# Patient Record
Sex: MALE | Race: WHITE | NOT HISPANIC OR LATINO | Employment: FULL TIME | URBAN - NONMETROPOLITAN AREA
[De-identification: names, ages, dates, MRNs, and addresses within clinical notes are randomized per-mention and may not be internally consistent; named-entity substitution may affect disease eponyms.]

---

## 2018-05-29 ENCOUNTER — DOCUMENTATION ONLY (OUTPATIENT)
Dept: FAMILY MEDICINE | Facility: OTHER | Age: 33
End: 2018-05-29

## 2018-05-29 ENCOUNTER — ALLIED HEALTH/NURSE VISIT (OUTPATIENT)
Dept: FAMILY MEDICINE | Facility: OTHER | Age: 33
End: 2018-05-29
Attending: FAMILY MEDICINE
Payer: COMMERCIAL

## 2018-05-29 DIAGNOSIS — F43.21 GRIEF: Primary | ICD-10-CM

## 2018-05-29 PROCEDURE — 99207 ZZC NO CHARGE NURSE ONLY: CPT

## 2018-05-29 NOTE — PROGRESS NOTES
Spoke with patient who was looking for SSN for his father who passed. During discussion, became clear that patient has not yet received a death certificate which is the issue yet. Encouraged patient to contact [facility] providers who treated his father for status on the certificate .

## 2018-05-29 NOTE — PROGRESS NOTES
Patient met with  regarding his father's death and needing his SSN to have him cremated. There was no release in our recordfs so were unable to assist him but gave him the phone numbers needed for someone who could better assist him.

## 2018-05-29 NOTE — MR AVS SNAPSHOT
After Visit Summary   5/29/2018    Mario Vance    MRN: [id number]           Patient Information     Date Of Birth          1985        Visit Information        Provider Department      5/29/2018 1:00 PM HC FP NURSE [facility]        Today's Diagnoses     Grief    -  1       Follow-ups after your visit        Who to contact     If you have questions or need follow up information about today's clinic visit or your schedule please contact [facility] directly at [phone].  Normal or non-critical lab and imaging results will be communicated to you by MyChart, letter or phone within 4 business days after the clinic has received the results. If you do not hear from us within 7 days, please contact the clinic through MyChart or phone. If you have a critical or abnormal lab result, we will notify you by phone as soon as possible.  Submit refill requests through Normal or call your pharmacy and they will forward the refill request to us. Please allow 3 business days for your refill to be completed.          Additional Information About Your Visit        Care EveryWhere ID     This is your Care EveryWhere ID. This could be used by other organizations to access your [facility] medical records  [id number]         Blood Pressure from Last 3 Encounters:   04/21/15 100/68   04/10/15 128/80   04/06/15 121/96    Weight from Last 3 Encounters:   04/21/15 210 lb (95.3 kg)   04/10/15 210 lb (95.3 kg)   04/06/15 210 lb (95.3 kg)              Today, you had the following     No orders found for display       Primary Care Provider Fax #    Physician No Ref-Primary [phone]       No address on file        Equal Access to Services     MAI VIVEROS : Gerson Alcaraz, waaxda luqadaha, qaybta kaalmada tyson de la o. So [facility] [phone].    ATENCIÓN: Si habla español, tiene a marcus disposición servicios gratuitos de asistencia  lingüísticaLorie Sands al [phone].    We comply with applicable federal civil rights laws and [place] laws. We do not discriminate on the basis of race, color, national origin, age, disability, sex, sexual orientation, or gender identity.            Thank you!     Thank you for choosing [facility]  for your care. Our goal is always to provide you with excellent care. Hearing back from our patients is one way we can continue to improve our services. Please take a few minutes to complete the written survey that you may receive in the mail after your visit with us. Thank you!             Your Updated Medication List - Protect others around you: Learn how to safely use, store and throw away your medicines at www.disposemymeds.org.      Notice  As of 5/29/2018  1:08 PM    You have not been prescribed any medications.

## 2019-01-17 ENCOUNTER — TRANSFERRED RECORDS (OUTPATIENT)
Dept: HEALTH INFORMATION MANAGEMENT | Facility: CLINIC | Age: 34
End: 2019-01-17

## 2019-01-17 LAB
ALT SERPL-CCNC: 258 U/L (ref 0–45)
AST SERPL-CCNC: 174 U/L (ref 0–41)
CHOLEST SERPL-MCNC: 262 MG/DL (ref 160–200)
CREAT SERPL-MCNC: 0.78 MG/DL (ref 0.6–1.4)
GLUCOSE SERPL-MCNC: 95 MG/DL (ref 60–109)
HDLC SERPL-MCNC: 66.5 MG/DL (ref 25–75)
HEP C HIM: NORMAL
LDLC SERPL CALC-MCNC: 166.7 MG/DL (ref 60–185)
TRIGL SERPL-MCNC: 144 MG/DL (ref 10–200)

## 2019-01-22 LAB
ALBUMIN 24H UR-MCNC: 33 MG/D
ALBUMIN SERPL-MCNC: 4.8 G/DL
ALP SERPL-CCNC: 130 U/L
ALT SERPL-CCNC: 258 U/L
AST SERPL-CCNC: 174 U/L
BILIRUB SERPL-MCNC: 0.23 MG/DL
BLOOD URINE (EXTERNAL): 0
BUN SERPL-MCNC: 11 MG/DL
BUN SERPL-MCNC: 11 MG/DL
CHOLEST SERPL-MCNC: 262 MG/DL
CHOLEST/HDLC SERPL: 3.93 {RATIO}
CREAT SERPL-MCNC: 0.78 MG/DL
CREATINE URINE: 284
FRUCTOSAMINE SERPL-SCNC: 1.43 MMOL/L (ref 1.2–2)
FRUCTOSAMINE SERPL-SCNC: 1.43 MMOL/L (ref 190–270)
GLOBULIN: 2.6 G/DL
GLUCOSE SERPL-MCNC: 95 MG/DL (ref 70–99)
GLUCOSE URINE: ABNORMAL MG/DL
HDLC SERPL-MCNC: 66.5 MG/DL
HEAPTITIS C ABY: NORMAL
HIV1 AB SPEC QL IA.RAPID: NON REACTIVE
HYALINE CAST URINE (EXTERNAL): 0
LDL CHOLESTEROL: 166.7
LEUKOCYTE ESTERASE UR: POSITIVE U/L
Lab: 0
Lab: 311
Lab: NEGATIVE
NICOTINE: POSITIVE
PROT SERPL-MCNC: 7.4 G/DL
PROTEIN/CREAT RATIO, RANDOM UR: 116 MG/GCREA
RBC URINE: 0
TRIGL SERPL-MCNC: 144 MG/DL
WBC URINE: 0

## 2019-02-12 NOTE — PROGRESS NOTES
"  SUBJECTIVE:   Mario aVnce is a 33 year old male who presents to clinic today for the following health issues:      New Patient/Transfer of Care    Patient is a 33 year old male who presents to clinic to establish care. He reports no medical problems now or in th past. 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 He was also denied life insurance due to elevated LFTs on his life insurance physical. He states this was done just after an all inclusive trip to [place] and he feels the labs have likely improved.     He has no other concerns today. He does note that his BP was elevated today. He is slightly anxious and reports drinking a large energy drink prior to appointment. He has not been told he has elevated BP in the past.     Problem list and histories reviewed & adjusted, as indicated.  Additional history: as documented    Labs reviewed in EPIC    Reviewed and updated as needed this visit by clinical staff  Tobacco  Allergies  Meds  Problems  Med Hx  Surg Hx  Fam Hx  Soc Hx        Reviewed and updated as needed this visit by Provider  Tobacco  Allergies  Meds  Problems  Med Hx  Surg Hx  Fam Hx  Soc Hx          ROS:  Constitutional, HEENT, cardiovascular, pulmonary, gi and gu systems are negative, except as otherwise noted.    OBJECTIVE:     BP (!) 150/100 (BP Location: Left arm, Patient Position: Chair, Cuff Size: Adult Large)   Pulse 66   Temp 98  F (36.7  C) (Tympanic)   Resp 20   Wt 120.2 kg (265 lb)   .6 cm (66\")   SpO2 96%   BMI 42.77 kg/m    Body mass index is 42.77 kg/m .  GENERAL: healthy, alert and no distress  NECK: no adenopathy, no asymmetry, masses, or scars and thyroid normal to palpation  RESP: lungs clear to auscultation - no rales, rhonchi or wheezes  CV: regular rate and rhythm, normal S1 S2, no S3 or S4, no murmur, click or rub, no peripheral edema and peripheral pulses strong  MS: no gross " musculoskeletal defects noted, no edema  NEURO: Normal strength and tone, mentation intact and speech normal  PSYCH: mentation appears normal, affect normal/bright    Diagnostic Test Results:  19 - OUTSIDE LABS  ALK Phos: 130  AST: 174  ALT: 258  GGT: 311  Cholesterol: 262  LDL: 166  T  HDL: 66    Hepatitis C: Non reactive  HIV: Non reactive    UA: total protein 33 (high) o/w wnl        ASSESSMENT/PLAN:     Morbid obesity (H)  BMI is 42 with elevated cholesterol levels. Recommend weight loss. Possibility of fatty liver with elevated liver tests likely. Discuss more at physical.     Tobacco Abuse  Eventually would like to quit. No interested in rx at this time.     Elevated blood pressure reading without diagnosis of hypertension  Pt just had an energy drink prior to appt. No previous elevated BPs. Recheck at physical next month.     Elevation of level of transaminase or lactic acid dehydrogenase (LDH)  Will repeat labwork for upcoming physical exam fasting. Pt denies regular etoh use when not on vacation.       Elisabeth Carvalho MD  [facility] [place]

## 2019-02-19 ENCOUNTER — OFFICE VISIT (OUTPATIENT)
Dept: FAMILY MEDICINE | Facility: OTHER | Age: 34
End: 2019-02-19
Attending: FAMILY MEDICINE
Payer: COMMERCIAL

## 2019-02-19 VITALS
SYSTOLIC BLOOD PRESSURE: 150 MMHG | OXYGEN SATURATION: 96 % | WEIGHT: 265 LBS | BODY MASS INDEX: 42.77 KG/M2 | TEMPERATURE: 98 F | HEART RATE: 66 BPM | RESPIRATION RATE: 20 BRPM | DIASTOLIC BLOOD PRESSURE: 100 MMHG

## 2019-02-19 DIAGNOSIS — E66.01 MORBID OBESITY (H): Primary | ICD-10-CM

## 2019-02-19 DIAGNOSIS — R80.9 PROTEINURIA, UNSPECIFIED TYPE: ICD-10-CM

## 2019-02-19 DIAGNOSIS — R03.0 ELEVATED BLOOD PRESSURE READING WITHOUT DIAGNOSIS OF HYPERTENSION: ICD-10-CM

## 2019-02-19 DIAGNOSIS — E78.5 HYPERLIPIDEMIA, UNSPECIFIED HYPERLIPIDEMIA TYPE: ICD-10-CM

## 2019-02-19 PROCEDURE — 99213 OFFICE O/P EST LOW 20 MIN: CPT | Performed by: FAMILY MEDICINE

## 2019-02-19 ASSESSMENT — PATIENT HEALTH QUESTIONNAIRE - PHQ9
5. POOR APPETITE OR OVEREATING: NOT AT ALL
SUM OF ALL RESPONSES TO PHQ QUESTIONS 1-9: 0

## 2019-02-19 ASSESSMENT — ANXIETY QUESTIONNAIRES
GAD7 TOTAL SCORE: 0
1. FEELING NERVOUS, ANXIOUS, OR ON EDGE: NOT AT ALL
3. WORRYING TOO MUCH ABOUT DIFFERENT THINGS: NOT AT ALL
5. BEING SO RESTLESS THAT IT IS HARD TO SIT STILL: NOT AT ALL
7. FEELING AFRAID AS IF SOMETHING AWFUL MIGHT HAPPEN: NOT AT ALL
2. NOT BEING ABLE TO STOP OR CONTROL WORRYING: NOT AT ALL
6. BECOMING EASILY ANNOYED OR IRRITABLE: NOT AT ALL

## 2019-02-19 ASSESSMENT — PAIN SCALES - GENERAL: PAINLEVEL: NO PAIN (0)

## 2019-02-19 NOTE — NURSING NOTE
"Chief Complaint   Patient presents with     Establish Care       Initial BP (!) 150/100 (BP Location: Left arm, Patient Position: Chair, Cuff Size: Adult Large)   Pulse 66   Temp 98  F (36.7  C) (Tympanic)   Resp 20   Wt 120.2 kg (265 lb)   .6 cm (66\")   SpO2 96%   BMI 42.77 kg/m   Estimated body mass index is 42.77 kg/m  as calculated from the following:    Height as of 4/21/15: 1.676 m (5' 6\").    Weight as of this encounter: 120.2 kg (265 lb).  Medication Reconciliation: complete    Rachel Leigh LPN    "

## 2019-02-19 NOTE — LETTER
My Depression Action Plan  Name: Mario Vance   Date of Birth 1985  Date: 2/12/2019    My doctor: No Ref-Primary, Physician   My clinic: [facility] [place]  [address]  [place] [address]  [phone]          GREEN    ZONE   Good Control    What it looks like:     Things are going generally well. You have normal up s and down s. You may even feel depressed from time to time, but bad moods usually last less than a day.   What you need to do:  1. Continue to care for yourself (see self care plan)  2. Check your depression survival kit and update it as needed  3. Follow your physician s recommendations including any medication.  4. Do not stop taking medication unless you consult with your physician first.           YELLOW         ZONE Getting Worse    What it looks like:     Depression is starting to interfere with your life.     It may be hard to get out of bed; you may be starting to isolate yourself from others.    Symptoms of depression are starting to last most all day and this has happened for several days.     You may have suicidal thoughts but they are not constant.   What you need to do:     1. Call your care team, your response to treatment will improve if you keep your care team informed of your progress. Yellow periods are signs an adjustment may need to be made.     2. Continue your self-care, even if you have to fake it!    3. Talk to someone in your support network    4. Open up your depression survival kit           RED    ZONE Medical Alert - Get Help    What it looks like:     Depression is seriously interfering with your life.     You may experience these or other symptoms: You can t get out of bed most days, can t work or engage in other necessary activities, you have trouble taking care of basic hygiene, or basic responsibilities, thoughts of suicide or death that will not go away, self-injurious behavior.     What you need to do:  1. Call your care team  and request a same-day appointment. If they are not available (weekends or after hours) call your local crisis line, emergency room or 911.            Depression Self Care Plan / Survival Kit    Self-Care for Depression  Here s the deal. Your body and mind are really not as separate as most people think.  What you do and think affects how you feel and how you feel influences what you do and think. This means if you do things that people who feel good do, it will help you feel better.  Sometimes this is all it takes.  There is also a place for medication and therapy depending on how severe your depression is, so be sure to consult with your medical provider and/ or Behavioral Health Consultant if your symptoms are worsening or not improving.     In order to better manage my stress, I will:    Exercise  Get some form of exercise, every day. This will help reduce pain and release endorphins, the  feel good  chemicals in your brain. This is almost as good as taking antidepressants!  This is not the same as joining a gym and then never going! (they count on that by the way ) It can be as simple as just going for a walk or doing some gardening, anything that will get you moving.      Hygiene   Maintain good hygiene (Get out of bed in the morning, Make your bed, Brush your teeth, Take a shower, and Get dressed like you were going to work, even if you are unemployed).  If your clothes don't fit try to get ones that do.    Diet  I will strive to eat foods that are good for me, drink plenty of water, and avoid excessive sugar, caffeine, alcohol, and other mood-altering substances.  Some foods that are helpful in depression are: complex carbohydrates, B vitamins, flaxseed, fish or fish oil, fresh fruits and vegetables.    Psychotherapy  I agree to participate in Individual Therapy (if recommended).    Medication  If prescribed medications, I agree to take them.  Missing doses can result in serious side effects.  I understand  that drinking alcohol, or other illicit drug use, may cause potential side effects.  I will not stop my medication abruptly without first discussing it with my provider.    Staying Connected With Others  I will stay in touch with my friends, family members, and my primary care provider/team.    Use your imagination  Be creative.  We all have a creative side; it doesn t matter if it s oil painting, sand castles, or mud pies! This will also kick up the endorphins.    Witness Beauty  (AKA stop and smell the roses) Take a look outside, even in mid-winter. Notice colors, textures. Watch the squirrels and birds.     Service to others  Be of service to others.  There is always someone else in need.  By helping others we can  get out of ourselves  and remember the really important things.  This also provides opportunities for practicing all the other parts of the program.    Humor  Laugh and be silly!  Adjust your TV habits for less news and crime-drama and more comedy.    Control your stress  Try breathing deep, massage therapy, biofeedback, and meditation. Find time to relax each day.     My support system    Clinic Contact:  Phone number:    Contact 1:  Phone number:    Contact 2:  Phone number:    Worship/:  Phone number:    Therapist:  Phone number:    Local crisis center:    Phone number:    Other community support:  Phone number:

## 2019-02-20 ASSESSMENT — ANXIETY QUESTIONNAIRES: GAD7 TOTAL SCORE: 0

## 2019-02-21 PROBLEM — E66.01 MORBID OBESITY (H): Status: ACTIVE | Noted: 2019-02-21

## 2019-02-26 ENCOUNTER — TELEPHONE (OUTPATIENT)
Dept: FAMILY MEDICINE | Facility: OTHER | Age: 34
End: 2019-02-26

## 2019-02-26 NOTE — PROGRESS NOTES
SUBJECTIVE:   CC: Mario Vance is an 33 year old male who presents for preventive health visit.     Healthy Habits:    Do you get at least three servings of calcium containing foods daily (dairy, green leafy vegetables, etc.)? No    Amount of exercise or daily activities, outside of work: 2-3 day(s) per week    Problems taking medications regularly No    Medication side effects: No    Have you had an eye exam in the past two years? no    Do you see a dentist twice per year? yes    Do you have sleep apnea, excessive snoring or daytime drowsiness?no      Today's PHQ-2 Score:   PHQ-2 ( 1999 Pfizer) 3/5/2019   Q1: Little interest or pleasure in doing things 0   Q2: Feeling down, depressed or hopeless 0   PHQ-2 Score 0       Abuse: Current or Past(Physical, Sexual or Emotional)- No  Do you feel safe in your environment? Yes    Social History     Tobacco Use     Smoking status: Never Smoker     Smokeless tobacco: Current User     Types: Chew     Tobacco comment: chews daily    Substance Use Topics     Alcohol use: Yes     Comment: 1/2 -1 case of beer weekly     If you drink alcohol do you typically have >3 drinks per day or >7 drinks per week? No                      Last PSA: No results found for: PSA    Reviewed orders with patient. Reviewed health maintenance and updated orders accordingly - YES    Cancer Screenings:  Colon - Due for general screening.   Cervical - NA  Breast - NA  Lung - NA  Prostate - NA  Skin - No FH of melanoma. Wears sunblock.     Immunizations:  Tdap - 2015  Zoster - NA  Influenza - Denies  Prevnar - NA  Pneumovax - NA    Cardiovascular:  Fasting glucose/Hgb A1C: Pending  Cholesterol: Pending  ASCVD score:     Reviewed and updated as needed this visit by clinical staff  Tobacco  Allergies  Meds  Problems  Med Hx  Surg Hx  Fam Hx  Soc Hx          Reviewed and updated as needed this visit by Provider  Tobacco  Allergies  Meds  Problems  Med Hx  Surg Hx  Fam Hx  Soc Hx        "      ROS:  CONSTITUTIONAL: NEGATIVE for fever, chills, change in weight  INTEGUMENTARY/SKIN: NEGATIVE for worrisome rashes, moles or lesions  EYES: NEGATIVE for vision changes or irritation  ENT: NEGATIVE for ear, mouth and throat problems  RESP: NEGATIVE for significant cough or SOB  CV: NEGATIVE for chest pain, palpitations or peripheral edema  GI: NEGATIVE for nausea, abdominal pain, heartburn, or change in bowel habits   male: negative for dysuria, hematuria, decreased urinary stream, erectile dysfunction, urethral discharge  MUSCULOSKELETAL: NEGATIVE for significant arthralgias or myalgia  NEURO: NEGATIVE for weakness, dizziness or paresthesias  PSYCHIATRIC: NEGATIVE for changes in mood or affect    OBJECTIVE:   /84   Pulse 79   Temp 98  F (36.7  C) (Tympanic)   Resp 16   Ht 1.676 m (5' 6\")   Wt 120.2 kg (265 lb)   SpO2 98%   BMI 42.77 kg/m    EXAM:  GENERAL: healthy, alert and no distress  EYES: Eyes grossly normal to inspection, PERRL and conjunctivae and sclerae normal  HENT: ear canals and TM's normal, nose and mouth without ulcers or lesions  NECK: no adenopathy, no asymmetry, masses, or scars and thyroid normal to palpation  RESP: lungs clear to auscultation - no rales, rhonchi or wheezes  CV: regular rate and rhythm, normal S1 S2, no S3 or S4, no murmur, click or rub, no peripheral edema and peripheral pulses strong  ABDOMEN: soft, nontender, no hepatosplenomegaly, no masses and bowel sounds normal  MS: no gross musculoskeletal defects noted, no edema  SKIN: no suspicious lesions or rashes  NEURO: Normal strength and tone, mentation intact and speech normal  PSYCH: mentation appears normal, affect normal/bright    Diagnostic Test Results:  Results for orders placed or performed in visit on 03/05/19 (from the past 24 hour(s))   TSH with free T4 reflex   Result Value Ref Range    TSH 3.85 0.40 - 4.00 mU/L   CBC with platelets and differential   Result Value Ref Range    WBC 5.3 4.0 - 11.0 " 10e9/L    RBC Count 5.47 4.4 - 5.9 10e12/L    Hemoglobin 15.8 13.3 - 17.7 g/dL    Hematocrit 44.5 40.0 - 53.0 %    MCV 81 78 - 100 fl    MCH 28.9 26.5 - 33.0 pg    MCHC 35.5 31.5 - 36.5 g/dL    RDW 12.2 10.0 - 15.0 %    Platelet Count 273 150 - 450 10e9/L    Diff Method Manual Differential     % Neutrophils 44.0 %    % Lymphocytes 45.0 %    % Monocytes 7.0 %    % Eosinophils 3.0 %    % Basophils 1.0 %    Absolute Neutrophil 2.3 1.6 - 8.3 10e9/L    Absolute Lymphocytes 2.4 0.8 - 5.3 10e9/L    Absolute Monocytes 0.4 0.0 - 1.3 10e9/L    Absolute Eosinophils 0.2 0.0 - 0.7 10e9/L    Absolute Basophils 0.1 0.0 - 0.2 10e9/L    Reactive Lymphs Present     RBC Morphology Consistent with reported results     Platelet Estimate       Automated count confirmed.  Platelet morphology is normal.   UA reflex to Microscopic and Culture - [place]   Result Value Ref Range    Color Urine Yellow     Appearance Urine Clear     Glucose Urine Negative NEG^Negative mg/dL    Bilirubin Urine Negative NEG^Negative    Ketones Urine Negative NEG^Negative mg/dL    Specific Gravity Urine 1.029 1.003 - 1.035    Blood Urine Negative NEG^Negative    pH Urine 5.5 4.7 - 8.0 pH    Protein Albumin Urine 10 (A) NEG^Negative mg/dL    Urobilinogen mg/dL Normal 0.0 - 2.0 mg/dL    Nitrite Urine Negative NEG^Negative    Leukocyte Esterase Urine Negative NEG^Negative    Source Midstream Urine     RBC Urine <1 0 - 2 /HPF    WBC Urine 1 0 - 5 /HPF    Bacteria Urine None (A) NEG^Negative /HPF    Mucous Urine Present (A) NEG^Negative /LPF   Comprehensive metabolic panel (BMP + Alb, Alk Phos, ALT, AST, Total. Bili, TP)   Result Value Ref Range    Sodium 140 133 - 144 mmol/L    Potassium 3.9 3.4 - 5.3 mmol/L    Chloride 107 94 - 109 mmol/L    Carbon Dioxide 26 20 - 32 mmol/L    Anion Gap 7 3 - 14 mmol/L    Glucose 101 (H) 70 - 99 mg/dL    Urea Nitrogen 13 7 - 30 mg/dL    Creatinine 0.75 0.66 - 1.25 mg/dL    GFR Estimate >90 >60 mL/min/[1.73_m2]    GFR Estimate If  Black >90 >60 mL/min/[1.73_m2]    Calcium 8.6 8.5 - 10.1 mg/dL    Bilirubin Total 0.3 0.2 - 1.3 mg/dL    Albumin 3.9 3.4 - 5.0 g/dL    Protein Total 7.3 6.8 - 8.8 g/dL    Alkaline Phosphatase 86 40 - 150 U/L    ALT 88 (H) 0 - 70 U/L    AST 34 0 - 45 U/L   Lipid Profile (Chol, Trig, HDL, LDL calc)   Result Value Ref Range    Cholesterol 197 <200 mg/dL    Triglycerides 291 (H) <150 mg/dL    HDL Cholesterol 31 (L) >39 mg/dL    LDL Cholesterol Calculated 108 (H) <100 mg/dL    Non HDL Cholesterol 166 (H) <130 mg/dL       ASSESSMENT/PLAN:     Routine history and physical examination of adult  STD testing done recently at outside facility. Labs as below. UTD on immunizations, declines flu shot.     Elevated blood pressure reading without diagnosis of hypertension  Better today. Recheck within a year. Discussed weight loss.   - TSH with free T4 reflex  - CBC with platelets and differential    Elevation of level of transaminase or lactic acid dehydrogenase (LDH)  Improved significantly. AST remains slightly high, suspect this will come down as well with limiting etoh and weight loss. Recheck in 6-12 months. If remains abl, will get US.   - TSH with free T4 reflex  - Alkaline phosphatase isoenzymes  - Comprehensive metabolic panel (BMP + Alb, Alk Phos, ALT, AST, Total. Bili, TP)    Proteinuria, unspecified type  Trace on repeat. Consider quant at follow up  - UA reflex to Microscopic and Culture - [place]    Hyperlipidemia, unspecified hyperlipidemia type  Discussed. Work on lifestyle changes.   - Lipid Profile (Chol, Trig, HDL, LDL calc).    Tobacco abuse  Continues to chew, working on quitting. Discussed health effects.   - Tobacco Cessation - Order to Satisfy Health Maintenance      COUNSELING:  Reviewed preventive health counseling, as reflected in patient instructions       Regular exercise       Healthy diet/nutrition    BP Readings from Last 1 Encounters:   03/05/19 136/84     Estimated body mass index is 42.77 kg/m   "as calculated from the following:    Height as of this encounter: 1.676 m (5' 6\").    Weight as of this encounter: 120.2 kg (265 lb).    BP Screening:   Last 3 BP Readings:    BP Readings from Last 3 Encounters:   03/05/19 136/84   02/19/19 (!) 150/100   04/21/15 100/68       The following was recommended to the patient:  Re-screen BP within a year and recommended lifestyle modifications  Weight management plan: Discussed healthy diet and exercise guidelines     reports that  has never smoked. His smokeless tobacco use includes chew.  Tobacco Cessation Action Plan: Information offered: Patient not interested at this time    Counseling Resources:  ATP IV Guidelines  Pooled Cohorts Equation Calculator  FRAX Risk Assessment  ICSI Preventive Guidelines  Dietary Guidelines for Americans, 2010  USDA's MyPlate  ASA Prophylaxis  Lung CA Screening    Elisabeth Carvalho MD  [facility] [place]  "

## 2019-02-26 NOTE — PATIENT INSTRUCTIONS
Preventive Health Recommendations  Male Ages 26 - 39    Yearly exam:             See your health care provider every year in order to  o   Review health changes.   o   Discuss preventive care.    o   Review your medicines if your doctor has prescribed any.    You should be tested each year for STDs (sexually transmitted diseases), if you re at risk.     After age 35, talk to your provider about cholesterol testing. If you are at risk for heart disease, have your cholesterol tested at least every 5 years.     If you are at risk for diabetes, you should have a diabetes test (fasting glucose).  Shots: Get a flu shot each year. Get a tetanus shot every 10 years.     Nutrition:    Eat at least 5 servings of fruits and vegetables daily.     Eat whole-grain bread, whole-wheat pasta and brown rice instead of white grains and rice.     Get adequate Calcium and Vitamin D.     Lifestyle    Exercise for at least 150 minutes a week (30 minutes a day, 5 days a week). This will help you control your weight and prevent disease.     Limit alcohol to one drink per day.     No smoking.     Wear sunscreen to prevent skin cancer.     See your dentist every six months for an exam and cleaning.     The Benefits of Living Smoke Free  What do you want to gain from quitting? Check off some reasons to quit.  Health Benefits  ___ Reduce my risk of lung cancer, heart disease, chronic lung disease  ___ Have fewer wrinkles and softer skin  ___ Improve my sense of taste and smell  ___ For pregnant women--reduce the risk of having a miscarriage, stillbirth, premature birth, or low-birth-weight baby  Personal Benefits  ___ Feel more in control of my life  ___ Have better-smelling hair, breath, clothes, home, and car  ___ Save time by not having to take smoke breaks, buy cigarettes, or hunt for a light  ___ Have whiter teeth  Family Benefits  ___ Reduce my children s respiratory tract infections  ___ Set a good example for my children  ___ Reduce  my family s cancer risk  Financial Benefits  ___ Save hundreds of dollars each year that would be spent on cigarettes  ___ Save money on medical bills  ___ Save on life, health, and car insurance premiums    Those Dollars Add Up!  Cigarettes are expensive, and getting more expensive all the time. Do you realize how much money you are spending on cigarettes per year? What is the average amount you spend on a pack of cigarettes? What is the average number of packs that you smoke per day? Using your answers to these questions, fill in this formula to help you find out:  ($ _____ per pack) ×  ( _____ number of packs per day) × (365 days) =  $ _____ yearly cost of smoking  Besides tobacco, there are other costs, including extra cleaning bills and replacement costs for clothing and furniture; medical expenses for smoking-related illnesses; and higher health, life, and car insurance premiums.    Cigars and Pipes Count Too!  Cigars and pipes are also dangerous. So are smokeless (chewing) tobacco and snuff. All of these products contain nicotine, a highly addictive substance that has harmful effects on your body. Quitting smoking means giving up all tobacco products.      0991-3297 [employer] [facility] [place], [address] [place] [address], [place] [id number]. All rights reserved. This information is not intended as a substitute for professional medical care. Always follow your healthcare professional's instructions.

## 2019-03-05 ENCOUNTER — OFFICE VISIT (OUTPATIENT)
Dept: FAMILY MEDICINE | Facility: OTHER | Age: 34
End: 2019-03-05
Attending: FAMILY MEDICINE
Payer: COMMERCIAL

## 2019-03-05 VITALS
SYSTOLIC BLOOD PRESSURE: 136 MMHG | DIASTOLIC BLOOD PRESSURE: 84 MMHG | OXYGEN SATURATION: 98 % | RESPIRATION RATE: 16 BRPM | HEIGHT: 66 IN | HEART RATE: 79 BPM | TEMPERATURE: 98 F | BODY MASS INDEX: 42.59 KG/M2 | WEIGHT: 265 LBS

## 2019-03-05 DIAGNOSIS — Z72.0 TOBACCO ABUSE: ICD-10-CM

## 2019-03-05 DIAGNOSIS — R03.0 ELEVATED BLOOD PRESSURE READING WITHOUT DIAGNOSIS OF HYPERTENSION: ICD-10-CM

## 2019-03-05 DIAGNOSIS — R80.9 PROTEINURIA, UNSPECIFIED TYPE: ICD-10-CM

## 2019-03-05 DIAGNOSIS — Z71.6 TOBACCO ABUSE COUNSELING: ICD-10-CM

## 2019-03-05 DIAGNOSIS — E78.5 HYPERLIPIDEMIA, UNSPECIFIED HYPERLIPIDEMIA TYPE: ICD-10-CM

## 2019-03-05 DIAGNOSIS — Z00.00 ROUTINE HISTORY AND PHYSICAL EXAMINATION OF ADULT: Primary | ICD-10-CM

## 2019-03-05 LAB
ALBUMIN SERPL-MCNC: 3.9 G/DL (ref 3.4–5)
ALBUMIN UR-MCNC: 10 MG/DL
ALP SERPL-CCNC: 86 U/L (ref 40–150)
ALT SERPL W P-5'-P-CCNC: 88 U/L (ref 0–70)
ANION GAP SERPL CALCULATED.3IONS-SCNC: 7 MMOL/L (ref 3–14)
APPEARANCE UR: CLEAR
AST SERPL W P-5'-P-CCNC: 34 U/L (ref 0–45)
BACTERIA #/AREA URNS HPF: ABNORMAL /HPF
BASOPHILS # BLD AUTO: 0.1 10E9/L (ref 0–0.2)
BASOPHILS NFR BLD AUTO: 1 %
BILIRUB SERPL-MCNC: 0.3 MG/DL (ref 0.2–1.3)
BILIRUB UR QL STRIP: NEGATIVE
BUN SERPL-MCNC: 13 MG/DL (ref 7–30)
CALCIUM SERPL-MCNC: 8.6 MG/DL (ref 8.5–10.1)
CHLORIDE SERPL-SCNC: 107 MMOL/L (ref 94–109)
CHOLEST SERPL-MCNC: 197 MG/DL
CO2 SERPL-SCNC: 26 MMOL/L (ref 20–32)
COLOR UR AUTO: YELLOW
CREAT SERPL-MCNC: 0.75 MG/DL (ref 0.66–1.25)
DIFFERENTIAL METHOD BLD: NORMAL
EOSINOPHIL # BLD AUTO: 0.2 10E9/L (ref 0–0.7)
EOSINOPHIL NFR BLD AUTO: 3 %
ERYTHROCYTE [DISTWIDTH] IN BLOOD BY AUTOMATED COUNT: 12.2 % (ref 10–15)
GFR SERPL CREATININE-BSD FRML MDRD: >90 ML/MIN/{1.73_M2}
GLUCOSE SERPL-MCNC: 101 MG/DL (ref 70–99)
GLUCOSE UR STRIP-MCNC: NEGATIVE MG/DL
HCT VFR BLD AUTO: 44.5 % (ref 40–53)
HDLC SERPL-MCNC: 31 MG/DL
HGB BLD-MCNC: 15.8 G/DL (ref 13.3–17.7)
HGB UR QL STRIP: NEGATIVE
KETONES UR STRIP-MCNC: NEGATIVE MG/DL
LDLC SERPL CALC-MCNC: 108 MG/DL
LEUKOCYTE ESTERASE UR QL STRIP: NEGATIVE
LYMPHOCYTES # BLD AUTO: 2.4 10E9/L (ref 0.8–5.3)
LYMPHOCYTES NFR BLD AUTO: 45 %
MCH RBC QN AUTO: 28.9 PG (ref 26.5–33)
MCHC RBC AUTO-ENTMCNC: 35.5 G/DL (ref 31.5–36.5)
MCV RBC AUTO: 81 FL (ref 78–100)
MONOCYTES # BLD AUTO: 0.4 10E9/L (ref 0–1.3)
MONOCYTES NFR BLD AUTO: 7 %
MUCOUS THREADS #/AREA URNS LPF: PRESENT /LPF
NEUTROPHILS # BLD AUTO: 2.3 10E9/L (ref 1.6–8.3)
NEUTROPHILS NFR BLD AUTO: 44 %
NITRATE UR QL: NEGATIVE
NONHDLC SERPL-MCNC: 166 MG/DL
PH UR STRIP: 5.5 PH (ref 4.7–8)
PLATELET # BLD AUTO: 273 10E9/L (ref 150–450)
PLATELET # BLD EST: NORMAL 10*3/UL
POTASSIUM SERPL-SCNC: 3.9 MMOL/L (ref 3.4–5.3)
PROT SERPL-MCNC: 7.3 G/DL (ref 6.8–8.8)
RBC # BLD AUTO: 5.47 10E12/L (ref 4.4–5.9)
RBC #/AREA URNS AUTO: <1 /HPF (ref 0–2)
RBC MORPH BLD: NORMAL
SODIUM SERPL-SCNC: 140 MMOL/L (ref 133–144)
SOURCE: ABNORMAL
SP GR UR STRIP: 1.03 (ref 1–1.03)
TRIGL SERPL-MCNC: 291 MG/DL
TSH SERPL DL<=0.005 MIU/L-ACNC: 3.85 MU/L (ref 0.4–4)
UROBILINOGEN UR STRIP-MCNC: NORMAL MG/DL (ref 0–2)
VARIANT LYMPHS BLD QL SMEAR: PRESENT
WBC # BLD AUTO: 5.3 10E9/L (ref 4–11)
WBC #/AREA URNS AUTO: 1 /HPF (ref 0–5)

## 2019-03-05 PROCEDURE — 80061 LIPID PANEL: CPT | Performed by: FAMILY MEDICINE

## 2019-03-05 PROCEDURE — 36415 COLL VENOUS BLD VENIPUNCTURE: CPT | Performed by: FAMILY MEDICINE

## 2019-03-05 PROCEDURE — 99000 SPECIMEN HANDLING OFFICE-LAB: CPT | Performed by: FAMILY MEDICINE

## 2019-03-05 PROCEDURE — 81001 URINALYSIS AUTO W/SCOPE: CPT | Performed by: FAMILY MEDICINE

## 2019-03-05 PROCEDURE — 99395 PREV VISIT EST AGE 18-39: CPT | Performed by: FAMILY MEDICINE

## 2019-03-05 PROCEDURE — 84080 ASSAY ALKALINE PHOSPHATASES: CPT | Mod: 90 | Performed by: FAMILY MEDICINE

## 2019-03-05 PROCEDURE — 80050 GENERAL HEALTH PANEL: CPT | Performed by: FAMILY MEDICINE

## 2019-03-05 ASSESSMENT — MIFFLIN-ST. JEOR: SCORE: 2094.78

## 2019-03-05 ASSESSMENT — PAIN SCALES - GENERAL: PAINLEVEL: NO PAIN (0)

## 2019-03-05 NOTE — LETTER
My Depression Action Plan  Name: Mario Vance   Date of Birth 1985  Date: 3/5/2019    My doctor: Elisabeth Carvalho   My clinic: [facility] [place]  [address]  [place] [address]  [phone]          GREEN    ZONE   Good Control    What it looks like:     Things are going generally well. You have normal up s and down s. You may even feel depressed from time to time, but bad moods usually last less than a day.   What you need to do:  1. Continue to care for yourself (see self care plan)  2. Check your depression survival kit and update it as needed  3. Follow your physician s recommendations including any medication.  4. Do not stop taking medication unless you consult with your physician first.           YELLOW         ZONE Getting Worse    What it looks like:     Depression is starting to interfere with your life.     It may be hard to get out of bed; you may be starting to isolate yourself from others.    Symptoms of depression are starting to last most all day and this has happened for several days.     You may have suicidal thoughts but they are not constant.   What you need to do:     1. Call your care team, your response to treatment will improve if you keep your care team informed of your progress. Yellow periods are signs an adjustment may need to be made.     2. Continue your self-care, even if you have to fake it!    3. Talk to someone in your support network    4. Open up your depression survival kit           RED    ZONE Medical Alert - Get Help    What it looks like:     Depression is seriously interfering with your life.     You may experience these or other symptoms: You can t get out of bed most days, can t work or engage in other necessary activities, you have trouble taking care of basic hygiene, or basic responsibilities, thoughts of suicide or death that will not go away, self-injurious behavior.     What you need to do:  1. Call your care team and request  a same-day appointment. If they are not available (weekends or after hours) call your local crisis line, emergency room or 911.            Depression Self Care Plan / Survival Kit    Self-Care for Depression  Here s the deal. Your body and mind are really not as separate as most people think.  What you do and think affects how you feel and how you feel influences what you do and think. This means if you do things that people who feel good do, it will help you feel better.  Sometimes this is all it takes.  There is also a place for medication and therapy depending on how severe your depression is, so be sure to consult with your medical provider and/ or Behavioral Health Consultant if your symptoms are worsening or not improving.     In order to better manage my stress, I will:    Exercise  Get some form of exercise, every day. This will help reduce pain and release endorphins, the  feel good  chemicals in your brain. This is almost as good as taking antidepressants!  This is not the same as joining a gym and then never going! (they count on that by the way ) It can be as simple as just going for a walk or doing some gardening, anything that will get you moving.      Hygiene   Maintain good hygiene (Get out of bed in the morning, Make your bed, Brush your teeth, Take a shower, and Get dressed like you were going to work, even if you are unemployed).  If your clothes don't fit try to get ones that do.    Diet  I will strive to eat foods that are good for me, drink plenty of water, and avoid excessive sugar, caffeine, alcohol, and other mood-altering substances.  Some foods that are helpful in depression are: complex carbohydrates, B vitamins, flaxseed, fish or fish oil, fresh fruits and vegetables.    Psychotherapy  I agree to participate in Individual Therapy (if recommended).    Medication  If prescribed medications, I agree to take them.  Missing doses can result in serious side effects.  I understand that drinking  alcohol, or other illicit drug use, may cause potential side effects.  I will not stop my medication abruptly without first discussing it with my provider.    Staying Connected With Others  I will stay in touch with my friends, family members, and my primary care provider/team.    Use your imagination  Be creative.  We all have a creative side; it doesn t matter if it s oil painting, sand castles, or mud pies! This will also kick up the endorphins.    Witness Beauty  (AKA stop and smell the roses) Take a look outside, even in mid-winter. Notice colors, textures. Watch the squirrels and birds.     Service to others  Be of service to others.  There is always someone else in need.  By helping others we can  get out of ourselves  and remember the really important things.  This also provides opportunities for practicing all the other parts of the program.    Humor  Laugh and be silly!  Adjust your TV habits for less news and crime-drama and more comedy.    Control your stress  Try breathing deep, massage therapy, biofeedback, and meditation. Find time to relax each day.     My support system    Clinic Contact:  Phone number:    Contact 1:  Phone number:    Contact 2:  Phone number:    Orthodox/:  Phone number:    Therapist:  Phone number:    Local crisis center:    Phone number:    Other community support:  Phone number:

## 2019-03-08 LAB
ALP BONE CFR SERPL: 35 U/L (ref 0–55)
ALP LIVER SERPL-CCNC: 49 U/L (ref 0–94)
ALP OTHER CFR SERPL: 0 U/L
ALP SERPL-CCNC: 84 U/L (ref 40–120)

## 2019-05-22 ENCOUNTER — HOSPITAL ENCOUNTER (EMERGENCY)
Facility: HOSPITAL | Age: 34
Discharge: HOME | End: 2019-05-22
Attending: FAMILY MEDICINE | Admitting: FAMILY MEDICINE
Payer: COMMERCIAL

## 2019-05-22 VITALS
TEMPERATURE: 97.9 F | OXYGEN SATURATION: 98 % | RESPIRATION RATE: 18 BRPM | SYSTOLIC BLOOD PRESSURE: 161 MMHG | DIASTOLIC BLOOD PRESSURE: 110 MMHG

## 2019-05-22 DIAGNOSIS — H10.9 CONJUNCTIVITIS, UNSPECIFIED CONJUNCTIVITIS TYPE, UNSPECIFIED LATERALITY: ICD-10-CM

## 2019-05-22 PROCEDURE — 99282 EMERGENCY DEPT VISIT SF MDM: CPT | Mod: Z6 | Performed by: FAMILY MEDICINE

## 2019-05-22 PROCEDURE — 99282 EMERGENCY DEPT VISIT SF MDM: CPT

## 2019-05-22 RX ORDER — TOBRAMYCIN 3 MG/ML
1-2 SOLUTION/ DROPS OPHTHALMIC
Qty: 5 ML | Refills: 0 | Status: SHIPPED | OUTPATIENT
Start: 2019-05-22 | End: 2019-12-24

## 2019-05-22 NOTE — ED AVS SNAPSHOT
[facility]  [address] [address]  Phone:  [phone]                                    Mario Vance   MRN: [id number]    Department:  [facility]   Date of Visit:  5/22/2019           After Visit Summary Signature Page    I have received my discharge instructions, and my questions have been answered. I have discussed any challenges I see with this plan with the nurse or doctor.    ..........................................................................................................................................  Patient/Patient Representative Signature      ..........................................................................................................................................  Patient Representative Print Name and Relationship to Patient    ..................................................               ................................................  Date                                   Time    ..........................................................................................................................................  Reviewed by Signature/Title    ...................................................              ..............................................  Date                                               Time          22EPIC Rev 08/18

## 2019-05-22 NOTE — ED NOTES
Pt to the ED with c/o bilateral eyes itching, redness, green drainage, and eyes were crusted shut this am.  States he was outside doing yard work, raking all day yesterday.  Eyes were a little irritated last HS.  Assessment is complete.  Call light given.

## 2019-05-22 NOTE — ED NOTES
Pt given discharge papers and verbalizes understanding of  discharge dx.  Rx escribed to pharmacy and pt to use as directed.  Denies pain. Discharged to home ambulatory.

## 2019-05-22 NOTE — ED PROVIDER NOTES
eMERGENCY dEPARTMENT eNCOUnter        CHIEF COMPLAINT    Chief Complaint   Patient presents with     Conjunctivitis     both eyes red this am with discharge. Mowing and raking the field yesterday.        HPI    Mario Vance is a 33 year old male who presents withbilateral eye crusting and discomfort since last night. He did some raking yesterday and seemed to get worse after this.    REVIEW OF SYSTEMS    General: No fevers or chills  GI: No nausea or vomiting  Skin: No new rash  Pulmonary: No shortness of breath or new cough  See HPI for further details.    PAST MEDICAL and SURGICAL HISTORY    Past Medical History:   Diagnosis Date     Tobacco abuse      Past Surgical History:   Procedure Laterality Date     AMPUTATE FINGER(S) Right 4/6/2015    Procedure: AMPUTATE FINGER(S);  Surgeon: Shakir Mcmanus MD;  Location: [facility]       CURRENT MEDICATIONS    Current Outpatient Rx   Medication Sig Dispense Refill     tobramycin (TOBREX) 0.3 % ophthalmic solution Place 1-2 drops into both eyes every 2 hours 5 mL 0       ALLERGIES    No Known Allergies    SOCIAL and FAMILY HISTORY    Social History     Socioeconomic History     Marital status: Single     Spouse name: Not on file     Number of children: Not on file     Years of education: Not on file     Highest education level: Not on file   Occupational History     Not on file   Social Needs     Financial resource strain: Not on file     Food insecurity:     Worry: Not on file     Inability: Not on file     Transportation needs:     Medical: Not on file     Non-medical: Not on file   Tobacco Use     Smoking status: Never Smoker     Smokeless tobacco: Current User     Types: Chew     Tobacco comment: chews daily    Substance and Sexual Activity     Alcohol use: Yes     Comment: 1/2 -1 case of beer weekly     Drug use: No     Sexual activity: Never   Lifestyle     Physical activity:     Days per week: Not on file     Minutes per session: Not on file     Stress: Not on file    Relationships     Social connections:     Talks on phone: Not on file     Gets together: Not on file     Attends Gnosticism service: Not on file     Active member of club or organization: Not on file     Attends meetings of clubs or organizations: Not on file     Relationship status: Not on file     Intimate partner violence:     Fear of current or ex partner: Not on file     Emotionally abused: Not on file     Physically abused: Not on file     Forced sexual activity: Not on file   Other Topics Concern     Parent/sibling w/ CABG, MI or angioplasty before 65F 55M? Not Asked   Social History Narrative     Not on file     Family History   Problem Relation Age of Onset     Kidney Cancer Father      Peripheral Vascular Disease Father        PHYSICAL EXAM    VITAL SIGNS: BP (!) 161/110   Temp 97.9  F (36.6  C) (Tympanic)   Resp 18   SpO2 98%   Constitutional:  Well developed, well nourished, no acute distress,   Eyes:  Pupils equally round and reactive to light, sclerae nonicteric, conjunctiva moist, red, purulence in the corner.  Fluorescein dye shows no abrasions   HENT:  Atraumatic, external ears normal, nose normal, oropharynx moist, no pharyngeal exudates.   Neck: Supple, no neck swelling   Respiratory:  No retractions  Cardiovascular:  No JVD  Integument:  Warm dry skin, no obvious rash  Neurologic:  No slurred speech, normal gait         ED COURSE & MEDICAL DECISION MAKING    Pertinent Labs & Imaging studies reviewed and interpreted. (See chart for details)    BP (!) 161/110   Temp 97.9  F (36.6  C) (Tympanic)   Resp 18   SpO2 98%       FINAL IMPRESSION    1. Conjunctivitis, unspecified conjunctivitis type, unspecified laterality        PLAN  tobrex drops.  Should clear up quickly        Patti Bell MD  05/22/19 [id number]

## 2019-12-24 ENCOUNTER — HOSPITAL ENCOUNTER (EMERGENCY)
Facility: HOSPITAL | Age: 34
Discharge: HOME | End: 2019-12-24
Attending: NURSE PRACTITIONER | Admitting: NURSE PRACTITIONER
Payer: COMMERCIAL

## 2019-12-24 VITALS
TEMPERATURE: 97.8 F | RESPIRATION RATE: 16 BRPM | SYSTOLIC BLOOD PRESSURE: 140 MMHG | OXYGEN SATURATION: 98 % | HEART RATE: 83 BPM | DIASTOLIC BLOOD PRESSURE: 90 MMHG

## 2019-12-24 DIAGNOSIS — J40 BRONCHITIS: ICD-10-CM

## 2019-12-24 LAB
DEPRECATED S PYO AG THROAT QL EIA: NORMAL
SPECIMEN SOURCE: NORMAL

## 2019-12-24 PROCEDURE — G0463 HOSPITAL OUTPT CLINIC VISIT: HCPCS

## 2019-12-24 PROCEDURE — 99213 OFFICE O/P EST LOW 20 MIN: CPT | Mod: Z6 | Performed by: NURSE PRACTITIONER

## 2019-12-24 PROCEDURE — 87081 CULTURE SCREEN ONLY: CPT | Performed by: EMERGENCY MEDICINE

## 2019-12-24 PROCEDURE — 87880 STREP A ASSAY W/OPTIC: CPT | Performed by: EMERGENCY MEDICINE

## 2019-12-24 RX ORDER — BENZONATATE 100 MG/1
200 CAPSULE ORAL 3 TIMES DAILY PRN
Qty: 18 CAPSULE | Refills: 0 | Status: SHIPPED | OUTPATIENT
Start: 2019-12-24 | End: 2020-02-03

## 2019-12-24 RX ORDER — AZITHROMYCIN 250 MG/1
TABLET, FILM COATED ORAL
Qty: 6 TABLET | Refills: 0 | Status: SHIPPED | OUTPATIENT
Start: 2019-12-24 | End: 2020-02-03

## 2019-12-24 ASSESSMENT — ENCOUNTER SYMPTOMS
SINUS PAIN: 1
EYE DISCHARGE: 1
FEVER: 0
SORE THROAT: 1
SHORTNESS OF BREATH: 0
ACTIVITY CHANGE: 1
SINUS PRESSURE: 1
GASTROINTESTINAL NEGATIVE: 1
COUGH: 1
TROUBLE SWALLOWING: 0
CHILLS: 0
RHINORRHEA: 0
NEUROLOGICAL NEGATIVE: 1
MUSCULOSKELETAL NEGATIVE: 1
VOICE CHANGE: 1

## 2019-12-24 NOTE — DISCHARGE INSTRUCTIONS
Increase oral intake, cool mist vaporizer as needed, rest, avoid sharing utensils, practice good hand washing techniques, cover mouth when you cough and sneeze. Throw toothbursh away 24 hours after starting antibiotics.  Over the counter medications such as ibuprofen and/or acetaminophen for fever and generalized aches and pains. Ibuprofen 400 to 800 mg (2 - 4 tabs of over the counter med) every six to eight hours as needed;not to exceed maximum amount of 3200 mg in 24 hours.Tylenol 650 to 1000 mg every four to six hours as needed (not to exceed more than 4000 mg in a 24 hour period). May use interchangeably. Robitussin (guaifenesin) for cough. Chest rubs such as Shawn's or Mentholatum may help reduce sore throat symptoms.  Chloraseptic spray for sore throat or menthol lozenges may be helpful for sore throat. Be reevaluated if symptoms persist longer than 10 - 14 days or worsen and if there is no improvement in 72 hours or worsening of symptoms.  Increase fluids. Complete all antibiotics even if feeling better. Taking antibiotics with food may decrease the stomach upset that can occur when taking antibiotics. Antibiotics frequently cause diarrhea. Probiotics or yogurt may help prevent or decrease these symptoms.     OTC decongestants (oral or topical).  Decongestants (oral or topical) cause vasoconstriction of the nasal mucosa.  We prefer oral pseudoephedrine to phenylephrine and other oral OTC nasal decongestants. Side effects of oral decongestants may include tachycardia, elevated diastolic blood pressure, and palpitations. Pseudoephedrine 30 to 60 mg every four to six hours as needed for congestion. (Maximum dose is 240 mg in 24 hours). Do not use longer than 72 hours.    Commonly used topical decongestants include oxymetazoline, xylometazoline, and phenylephrine. Side effects of topical decongestants include nosebleeds and drying of the nasal membranes. Topical decongestants should only be used for two to three  days; longer use may result in rebound nasal congestion after discontinuation.    Fluids, herbs, and foods for sore throat relief -- Adjusting the temperature and texture of foods and beverages may provide local relief of sore throat pain. While data showing benefit are quite limited, these approaches are intuitive. We typically advise these measures since they are likely to be safe with minimal adverse effect, and patients often describe relief of symptoms.  We suggest hydration with frozen (eg, ice or popsicles) or heated liquids (eg, teas, soups), rather than room temperature or refrigerated fluids in patient with significant sore throat pain. Very cold foods can have a numbing-like effect that temporarily reduces or alleviates the pain of swallowing. Ice cubes or frozen popsicles facilitate hydration; ice cream and frozen yogurt provide caloric intake.  Warm fluids and foods, including teas, soups, and soft non-irritating foods, may be better tolerated by patients with throat pain than irritating foods (eg, rough-textured or spicy foods) or fluids at room temperatures. Foods that coat the throat, including honey and hard candies, can facilitate intake of calories while temporarily relieving throat pain.

## 2019-12-24 NOTE — ED AVS SNAPSHOT
[facility]  [address] [place] [address]  Phone:  [phone]                                    Mario Vance   MRN: [id number]    Department:  [facility]   Date of Visit:  12/24/2019           After Visit Summary Signature Page    I have received my discharge instructions, and my questions have been answered. I have discussed any challenges I see with this plan with the nurse or doctor.    ..........................................................................................................................................  Patient/Patient Representative Signature      ..........................................................................................................................................  Patient Representative Print Name and Relationship to Patient    ..................................................               ................................................  Date                                   Time    ..........................................................................................................................................  Reviewed by Signature/Title    ...................................................              ..............................................  Date                                               Time          22EPIC Rev 08/18

## 2019-12-24 NOTE — ED PROVIDER NOTES
History     Chief Complaint   Patient presents with     URI     HPI  Mario Vance is a 34 year old male who presents with a four to five week history of chest congestion, ear pressure, sinus pain and pressure, voice changed, watery eyes, and cough. He has tried cough drops, flora seltzer and Mucinex at home with little relief and he does not seem to be improving. Denies smoking, he does chew. Denies fevers, chills, nausea, vomiting, diarrhea, and shortness of breath.      Allergies:  No Known Allergies    Problem List:    Patient Active Problem List    Diagnosis Date Noted     Tobacco abuse      Priority: Medium     Morbid obesity (H) 02/21/2019     Priority: Medium        Past Medical History:    Past Medical History:   Diagnosis Date     Tobacco abuse        Past Surgical History:    Past Surgical History:   Procedure Laterality Date     AMPUTATE FINGER(S) Right 4/6/2015    Procedure: AMPUTATE FINGER(S);  Surgeon: Shakir Mcmanus MD;  Location: [facility]       Family History:    Family History   Problem Relation Age of Onset     Kidney Cancer Father      Peripheral Vascular Disease Father        Social History:  Marital Status:  Single [1]  Social History     Tobacco Use     Smoking status: Never Smoker     Smokeless tobacco: Current User     Types: Chew     Tobacco comment: chews daily    Substance Use Topics     Alcohol use: Yes     Comment: 1/2 -1 case of beer weekly     Drug use: No        Medications:    azithromycin (ZITHROMAX) 250 MG tablet  benzonatate (TESSALON) 100 MG capsule          Review of Systems   Constitutional: Positive for activity change. Negative for chills and fever.        Fevers and chills have resolved   HENT: Positive for congestion, ear pain (pressure), sinus pressure, sinus pain, sore throat and voice change. Negative for rhinorrhea (resolved) and trouble swallowing.    Eyes: Positive for discharge (watery).        Puffy    Respiratory: Positive for cough. Negative for shortness of  breath.    Gastrointestinal: Negative.    Musculoskeletal: Negative.    Skin: Negative.    Neurological: Negative.        Physical Exam   BP: (!) 156/103  Pulse: 83  Temp: 97.8  F (36.6  C)  Resp: 16  SpO2: 98 %      Physical Exam  Vitals signs and nursing note reviewed.   Constitutional:       General: He is in acute distress.      Appearance: He is obese.   HENT:      Head: Normocephalic.      Right Ear: Ear canal normal. Tympanic membrane is retracted.      Left Ear: Ear canal normal. Tympanic membrane is retracted.      Nose: Mucosal edema and rhinorrhea present. Rhinorrhea is clear.      Right Sinus: No maxillary sinus tenderness or frontal sinus tenderness.      Left Sinus: No maxillary sinus tenderness or frontal sinus tenderness.      Mouth/Throat:      Lips: Pink.      Mouth: Mucous membranes are moist.      Pharynx: Oropharynx is clear.   Eyes:      Conjunctiva/sclera: Conjunctivae normal.   Cardiovascular:      Rate and Rhythm: Normal rate and regular rhythm.      Heart sounds: Normal heart sounds.   Pulmonary:      Effort: Pulmonary effort is normal.      Breath sounds: Normal breath sounds.      Comments: Dry non-productive cough with every deep breath  Lymphadenopathy:      Cervical: Cervical adenopathy (mild) present.      Right cervical: Superficial cervical adenopathy present.      Left cervical: Superficial cervical adenopathy present.   Skin:     General: Skin is warm and dry.   Neurological:      Mental Status: He is alert and oriented to person, place, and time.   Psychiatric:         Mood and Affect: Mood normal.         Behavior: Behavior normal.         ED Course        Procedures                 No results found for this or any previous visit (from the past 24 hour(s)).    Medications - No data to display    Assessments & Plan (with Medical Decision Making)     I have reviewed the nursing notes.    I have reviewed the findings, diagnosis, plan and need for follow up with the patient.  (J40)  Bronchitis    Comment: sinus pain and pressure and a cough for four to five weeks that is not resolving. Strep screen negative, culture pending.    Plan: Azithromycin Z-pack. Tessalon Perles for cough.    Treat symptoms conservatively with acetaminophen and  ibuprofen (if applicable) for fevers, body aches, and headaches, guaifenesin and/or honey for cough. May use chest rubs for sore throat and congestion, hot and cold liquids may help decrease sore throat and help you feel better, increase fluids, and may utilize pseudoephedrine for congestion. Return to be reevaluated by ER/UC or your primary care provider if symptoms worsen or do not improve in a reasonable time frame. It can take several days for a cough to resolve. It can take ten to fourteen days for upper respiratory symptoms to resolve.   Increase fluids. Complete all antibiotics even if feeling better.  Taking antibiotics with food may decrease the symptoms, of an upset stomach, that can occur when taking antibiotics. Antibiotics frequently cause diarrhea. Probiotics or yogurt may help prevent or decrease these symptoms. Return to be reevaluated if symptoms do not improve, or worsen.        Discharge Medication List as of 12/24/2019 11:19 AM      START taking these medications    Details   azithromycin (ZITHROMAX) 250 MG tablet Take 2 tablets (500 mg) by mouth daily for 1 day, THEN 1 tablet (250 mg) daily for 4 days., Disp-6 tablet, R-0, E-Prescribe      benzonatate (TESSALON) 100 MG capsule Take 2 capsules (200 mg) by mouth 3 times daily as needed for cough, Disp-18 capsule, R-0, E-Prescribe             Final diagnoses:   Bronchitis       12/24/2019   [facility]       Aixa Gage, CNP  12/25/19 6733

## 2019-12-24 NOTE — ED TRIAGE NOTES
Pt presents today with c/o puffy eyes, nasal congestion, swollen tonsils, loss of voice, dry mouth. For 5 weeks. No OTC medication. Has not seen PCP.

## 2019-12-26 LAB
BACTERIA SPEC CULT: NORMAL
SPECIMEN SOURCE: NORMAL

## 2020-02-03 ENCOUNTER — APPOINTMENT (OUTPATIENT)
Dept: GENERAL RADIOLOGY | Facility: HOSPITAL | Age: 35
End: 2020-02-03
Attending: EMERGENCY MEDICINE

## 2020-02-03 ENCOUNTER — HOSPITAL ENCOUNTER (EMERGENCY)
Facility: HOSPITAL | Age: 35
Discharge: HOME | End: 2020-02-03
Attending: EMERGENCY MEDICINE | Admitting: EMERGENCY MEDICINE

## 2020-02-03 VITALS
SYSTOLIC BLOOD PRESSURE: 159 MMHG | HEART RATE: 80 BPM | RESPIRATION RATE: 16 BRPM | TEMPERATURE: 98.4 F | BODY MASS INDEX: 40.18 KG/M2 | OXYGEN SATURATION: 96 % | DIASTOLIC BLOOD PRESSURE: 104 MMHG | HEIGHT: 66 IN | WEIGHT: 250 LBS

## 2020-02-03 DIAGNOSIS — R07.9 CHEST PAIN, UNSPECIFIED TYPE: ICD-10-CM

## 2020-02-03 LAB
AMPHETAMINES UR QL: NOT DETECTED NG/ML
ANION GAP SERPL CALCULATED.3IONS-SCNC: 6 MMOL/L (ref 3–14)
BARBITURATES UR QL SCN: NOT DETECTED NG/ML
BASOPHILS # BLD AUTO: 0 10E9/L (ref 0–0.2)
BASOPHILS NFR BLD AUTO: 0.9 %
BENZODIAZ UR QL SCN: NOT DETECTED NG/ML
BUN SERPL-MCNC: 13 MG/DL (ref 7–30)
BUPRENORPHINE UR QL: NOT DETECTED NG/ML
CALCIUM SERPL-MCNC: 8.7 MG/DL (ref 8.5–10.1)
CANNABINOIDS UR QL: NOT DETECTED NG/ML
CHLORIDE SERPL-SCNC: 105 MMOL/L (ref 94–109)
CO2 SERPL-SCNC: 26 MMOL/L (ref 20–32)
COCAINE UR QL SCN: NOT DETECTED NG/ML
CREAT SERPL-MCNC: 0.77 MG/DL (ref 0.66–1.25)
D DIMER PPP FEU-MCNC: <0.3 UG/ML FEU (ref 0–0.5)
D-METHAMPHET UR QL: NOT DETECTED NG/ML
DIFFERENTIAL METHOD BLD: NORMAL
EOSINOPHIL # BLD AUTO: 0.1 10E9/L (ref 0–0.7)
EOSINOPHIL NFR BLD AUTO: 2.6 %
ERYTHROCYTE [DISTWIDTH] IN BLOOD BY AUTOMATED COUNT: 12.9 % (ref 10–15)
ETHANOL SERPL-MCNC: <0.01 G/DL
GFR SERPL CREATININE-BSD FRML MDRD: >90 ML/MIN/{1.73_M2}
GLUCOSE SERPL-MCNC: 99 MG/DL (ref 70–99)
HCT VFR BLD AUTO: 43.4 % (ref 40–53)
HGB BLD-MCNC: 15.4 G/DL (ref 13.3–17.7)
IMM GRANULOCYTES # BLD: 0 10E9/L (ref 0–0.4)
IMM GRANULOCYTES NFR BLD: 0.2 %
LYMPHOCYTES # BLD AUTO: 1.3 10E9/L (ref 0.8–5.3)
LYMPHOCYTES NFR BLD AUTO: 29.6 %
MCH RBC QN AUTO: 29.1 PG (ref 26.5–33)
MCHC RBC AUTO-ENTMCNC: 35.5 G/DL (ref 31.5–36.5)
MCV RBC AUTO: 82 FL (ref 78–100)
METHADONE UR QL SCN: NOT DETECTED NG/ML
MONOCYTES # BLD AUTO: 0.4 10E9/L (ref 0–1.3)
MONOCYTES NFR BLD AUTO: 9.2 %
NEUTROPHILS # BLD AUTO: 2.4 10E9/L (ref 1.6–8.3)
NEUTROPHILS NFR BLD AUTO: 57.5 %
NRBC # BLD AUTO: 0 10*3/UL
NRBC BLD AUTO-RTO: 0 /100
OPIATES UR QL SCN: NOT DETECTED NG/ML
OXYCODONE UR QL SCN: NOT DETECTED NG/ML
PCP UR QL SCN: NOT DETECTED NG/ML
PLATELET # BLD AUTO: 216 10E9/L (ref 150–450)
POTASSIUM SERPL-SCNC: 3.4 MMOL/L (ref 3.4–5.3)
PROPOXYPH UR QL: NOT DETECTED NG/ML
RBC # BLD AUTO: 5.3 10E12/L (ref 4.4–5.9)
SODIUM SERPL-SCNC: 137 MMOL/L (ref 133–144)
TRICYCLICS UR QL SCN: NOT DETECTED NG/ML
TROPONIN I SERPL-MCNC: <0.015 UG/L (ref 0–0.04)
TROPONIN I SERPL-MCNC: <0.015 UG/L (ref 0–0.04)
WBC # BLD AUTO: 4.2 10E9/L (ref 4–11)

## 2020-02-03 PROCEDURE — 80048 BASIC METABOLIC PNL TOTAL CA: CPT | Performed by: EMERGENCY MEDICINE

## 2020-02-03 PROCEDURE — 85379 FIBRIN DEGRADATION QUANT: CPT | Performed by: EMERGENCY MEDICINE

## 2020-02-03 PROCEDURE — 93005 ELECTROCARDIOGRAM TRACING: CPT

## 2020-02-03 PROCEDURE — 93010 ELECTROCARDIOGRAM REPORT: CPT | Performed by: INTERNAL MEDICINE

## 2020-02-03 PROCEDURE — 71046 X-RAY EXAM CHEST 2 VIEWS: CPT | Mod: TC

## 2020-02-03 PROCEDURE — 84484 ASSAY OF TROPONIN QUANT: CPT | Performed by: EMERGENCY MEDICINE

## 2020-02-03 PROCEDURE — 99285 EMERGENCY DEPT VISIT HI MDM: CPT | Mod: 25

## 2020-02-03 PROCEDURE — 99285 EMERGENCY DEPT VISIT HI MDM: CPT | Mod: Z6 | Performed by: EMERGENCY MEDICINE

## 2020-02-03 PROCEDURE — 85025 COMPLETE CBC W/AUTO DIFF WBC: CPT | Performed by: EMERGENCY MEDICINE

## 2020-02-03 PROCEDURE — 80320 DRUG SCREEN QUANTALCOHOLS: CPT | Performed by: EMERGENCY MEDICINE

## 2020-02-03 PROCEDURE — 36415 COLL VENOUS BLD VENIPUNCTURE: CPT | Performed by: EMERGENCY MEDICINE

## 2020-02-03 PROCEDURE — 80306 DRUG TEST PRSMV INSTRMNT: CPT | Performed by: EMERGENCY MEDICINE

## 2020-02-03 ASSESSMENT — ENCOUNTER SYMPTOMS
FEVER: 0
RESPIRATORY NEGATIVE: 1
PSYCHIATRIC NEGATIVE: 1
ENDOCRINE NEGATIVE: 1
NEUROLOGICAL NEGATIVE: 1
MYALGIAS: 0
EYES NEGATIVE: 1
ALLERGIC/IMMUNOLOGIC NEGATIVE: 1
MUSCULOSKELETAL NEGATIVE: 1
CHILLS: 0
HEMATOLOGIC/LYMPHATIC NEGATIVE: 1
CONSTITUTIONAL NEGATIVE: 1
SHORTNESS OF BREATH: 0
NECK STIFFNESS: 0
DIZZINESS: 0
GASTROINTESTINAL NEGATIVE: 1
NECK PAIN: 0
COUGH: 0

## 2020-02-03 ASSESSMENT — MIFFLIN-ST. JEOR: SCORE: 2021.74

## 2020-02-03 NOTE — ED NOTES
Patient vital assessed.  Patient updated and moved to the consult room while awaiting further testing.

## 2020-02-03 NOTE — ED NOTES
Discharge instructions reviewed with patient, verbalizes understanding.  Encouraged to return if pt not getting better, or worsening.

## 2020-02-03 NOTE — ED PROVIDER NOTES
History     Chief Complaint   Patient presents with     Chest Pain     HPI  Mario Vance is a 34 year old male who presents today with complaints of chest pain.  Patient states that symptoms began this am at 4 am when he woke up from sleep with palpitations and chest pain. Chest pain lasted for approximately one hour before resolving.  Patient took aspirin and Rolaids prior to arrival.  Patient otherwise in usual state of health.  Patient denies any recreational drug use.  Patient denies any family history of heart disease.  No recent trauma  Allergies:  No Known Allergies    Problem List:    Patient Active Problem List    Diagnosis Date Noted     Tobacco abuse      Priority: Medium     Morbid obesity (H) 02/21/2019     Priority: Medium        Past Medical History:    Past Medical History:   Diagnosis Date     Tobacco abuse        Past Surgical History:    Past Surgical History:   Procedure Laterality Date     AMPUTATE FINGER(S) Right 4/6/2015    Procedure: AMPUTATE FINGER(S);  Surgeon: Shakir Mcmanus MD;  Location: [facility]       Family History:    Family History   Problem Relation Age of Onset     Kidney Cancer Father      Peripheral Vascular Disease Father        Social History:  Marital Status:  Single [1]  Social History     Tobacco Use     Smoking status: Never Smoker     Smokeless tobacco: Current User     Types: Chew     Tobacco comment: chews daily    Substance Use Topics     Alcohol use: Yes     Comment: 1/2 -1 case of beer weekly     Drug use: No        Medications:    No current outpatient medications on file.        Review of Systems   Constitutional: Negative.  Negative for chills and fever.   HENT: Negative.    Eyes: Negative.    Respiratory: Negative.  Negative for cough and shortness of breath.    Cardiovascular: Positive for chest pain.   Gastrointestinal: Negative.    Endocrine: Negative.    Genitourinary: Negative.    Musculoskeletal: Negative.  Negative for myalgias, neck pain and neck  "stiffness.   Skin: Negative.    Allergic/Immunologic: Negative.    Neurological: Negative.  Negative for dizziness.   Hematological: Negative.    Psychiatric/Behavioral: Negative.        Physical Exam   BP: (!) 197/133  Temp: 98.4  F (36.9  C)  Resp: 16  Height: 167.6 cm (5' 6\")  Weight: 113.4 kg (250 lb)  SpO2: 98 %      Physical Exam  Constitutional:       General: He is not in acute distress.     Appearance: He is well-developed and normal weight. He is not toxic-appearing.   HENT:      Head: Normocephalic.   Eyes:      Extraocular Movements: Extraocular movements intact.      Pupils: Pupils are equal, round, and reactive to light.   Neck:      Musculoskeletal: Normal range of motion and neck supple.   Cardiovascular:      Rate and Rhythm: Normal rate and regular rhythm.   Pulmonary:      Effort: Pulmonary effort is normal.      Breath sounds: Normal breath sounds.   Abdominal:      Palpations: Abdomen is soft.   Musculoskeletal: Normal range of motion.   Skin:     General: Skin is warm.      Capillary Refill: Capillary refill takes less than 2 seconds.   Neurological:      General: No focal deficit present.      Mental Status: He is alert. He is disoriented.   Psychiatric:         Mood and Affect: Mood normal.         Behavior: Behavior normal.         ED Course     Procedures    EKG: NSR without ST changes      Results for orders placed or performed during the hospital encounter of 02/03/20 (from the past 24 hour(s))   CBC with platelets differential   Result Value Ref Range    WBC 4.2 4.0 - 11.0 10e9/L    RBC Count 5.30 4.4 - 5.9 10e12/L    Hemoglobin 15.4 13.3 - 17.7 g/dL    Hematocrit 43.4 40.0 - 53.0 %    MCV 82 78 - 100 fl    MCH 29.1 26.5 - 33.0 pg    MCHC 35.5 31.5 - 36.5 g/dL    RDW 12.9 10.0 - 15.0 %    Platelet Count 216 150 - 450 10e9/L    Diff Method Automated Method     % Neutrophils 57.5 %    % Lymphocytes 29.6 %    % Monocytes 9.2 %    % Eosinophils 2.6 %    % Basophils 0.9 %    % Immature " Granulocytes 0.2 %    Nucleated RBCs 0 0 /100    Absolute Neutrophil 2.4 1.6 - 8.3 10e9/L    Absolute Lymphocytes 1.3 0.8 - 5.3 10e9/L    Absolute Monocytes 0.4 0.0 - 1.3 10e9/L    Absolute Eosinophils 0.1 0.0 - 0.7 10e9/L    Absolute Basophils 0.0 0.0 - 0.2 10e9/L    Abs Immature Granulocytes 0.0 0 - 0.4 10e9/L    Absolute Nucleated RBC 0.0    Basic metabolic panel   Result Value Ref Range    Sodium 137 133 - 144 mmol/L    Potassium 3.4 3.4 - 5.3 mmol/L    Chloride 105 94 - 109 mmol/L    Carbon Dioxide PENDING 20 - 32 mmol/L    Anion Gap PENDING 3 - 14 mmol/L    Glucose PENDING 70 - 99 mg/dL    Urea Nitrogen PENDING 7 - 30 mg/dL    Creatinine PENDING 0.66 - 1.25 mg/dL    GFR Estimate PENDING >60 mL/min/[1.73_m2]    GFR Estimate If Black PENDING >60 mL/min/[1.73_m2]    Calcium PENDING 8.5 - 10.1 mg/dL     CXR: negative  Medications - No data to display    Assessments & Plan (with Medical Decision Making)     34-year-old male with complaints of chest pain.  Symptoms resolved prior to arrival in the emergency department.  Patient has no significant risk factors.  Patient had 2 sets of cardiac enzymes and EKGs with no significant abnormalities noted.  Rest of labs noted as above    Case discussed with cardiology who recommended patient have a Holter monitor set up on discharge.  He is to follow-up with cardiology clinic.  Patient to return if he develops any new or worsening symptoms.    Due to the nature of this electronic medical record, laboratory results, imaging results, diagnosis, other information and medications reported above may not represent information available to me at the the time of my care and disposition. Medications reported above may have not been ordered by me.     Portions of the record may have been created with voice recognition software. Occasional wrong-word or 'sound-a- like' substitution may have occurred due to the inherent limitations of voice recognition software. Though the chart has  been reviewed, there may be inadvertent transcription errors. Read the chart carefully and recognize, using context, where substitutions have occurred.       New Prescriptions    No medications on file       Final diagnoses:   Chest pain, unspecified type       2/3/2020   [facility]     Nader Potter MD  02/14/20 [id number]

## 2020-02-03 NOTE — ED TRIAGE NOTES
Pt woke at around 0400 had a spurt of energy and had chest pains.  Took some aspirin and Rolaids which helped a little bit.

## 2020-02-03 NOTE — ED AVS SNAPSHOT
[facility]  [address] [address]  Phone:  [phone]                                    Mario Vance   MRN: [id number]    Department:  [facility]   Date of Visit:  2/3/2020           After Visit Summary Signature Page    I have received my discharge instructions, and my questions have been answered. I have discussed any challenges I see with this plan with the nurse or doctor.    ..........................................................................................................................................  Patient/Patient Representative Signature      ..........................................................................................................................................  Patient Representative Print Name and Relationship to Patient    ..................................................               ................................................  Date                                   Time    ..........................................................................................................................................  Reviewed by Signature/Title    ...................................................              ..............................................  Date                                               Time          22EPIC Rev 08/18

## 2020-02-24 ENCOUNTER — TRANSFERRED RECORDS (OUTPATIENT)
Dept: HEALTH INFORMATION MANAGEMENT | Facility: CLINIC | Age: 35
End: 2020-02-24

## 2020-03-30 ENCOUNTER — TELEPHONE (OUTPATIENT)
Dept: FAMILY MEDICINE | Facility: OTHER | Age: 35
End: 2020-03-30

## 2020-03-30 DIAGNOSIS — M10.9 GOUT, UNSPECIFIED CAUSE, UNSPECIFIED CHRONICITY, UNSPECIFIED SITE: Primary | ICD-10-CM

## 2020-03-31 RX ORDER — PREDNISONE 20 MG/1
TABLET ORAL
Qty: 20 TABLET | Refills: 0 | Status: SHIPPED | OUTPATIENT
Start: 2020-03-31 | End: 2020-09-20

## 2020-03-31 RX ORDER — PREDNISONE 20 MG/1
20 TABLET ORAL DAILY
Qty: 7 TABLET | Refills: 0 | Status: CANCELLED | OUTPATIENT
Start: 2020-03-31 | End: 2020-04-07

## 2020-03-31 NOTE — TELEPHONE ENCOUNTER
Pt states that it is his left foot.  [facility] and [facility] him some antiinflammatory and a shot of Asprin shot pt states .  Told to follow up with primary for medication.  Pt states that it has improved.  If he eats red meat or has alcohol it gets worse.  The [facility] provider also saw him and she gave him some indocin but they were suppose to send you blood work in regards to this.  I do see outside records from [facility] on the 24th of Feb.  Let me know what you would like to do and we can go from there.  I did not see any medication for reconcile in regards to gout medication.

## 2020-03-31 NOTE — TELEPHONE ENCOUNTER
That seems long for a gout flare. Could you get more information?     If truly gout, could try steroid, but not if improved in 48hrs, should be seen in walk in clinic.     Elisabeth Carvalho MD

## 2020-03-31 NOTE — TELEPHONE ENCOUNTER
Trial of steroid, if not sig improved in 48 hours, return for in person visit.   Elisabeth Carvalho MD

## 2020-03-31 NOTE — TELEPHONE ENCOUNTER
Patient notified of medication sent and that if it doesn't improve significantly in 48 hours to schedule in person visit.

## 2020-09-20 ENCOUNTER — HOSPITAL ENCOUNTER (EMERGENCY)
Facility: HOSPITAL | Age: 35
Discharge: HOME | End: 2020-09-20
Attending: NURSE PRACTITIONER | Admitting: NURSE PRACTITIONER
Payer: COMMERCIAL

## 2020-09-20 VITALS
OXYGEN SATURATION: 97 % | TEMPERATURE: 96.7 F | HEART RATE: 93 BPM | RESPIRATION RATE: 16 BRPM | DIASTOLIC BLOOD PRESSURE: 96 MMHG | SYSTOLIC BLOOD PRESSURE: 146 MMHG

## 2020-09-20 DIAGNOSIS — R03.0 ELEVATED BLOOD PRESSURE READING WITHOUT DIAGNOSIS OF HYPERTENSION: ICD-10-CM

## 2020-09-20 DIAGNOSIS — H10.32 ACUTE CONJUNCTIVITIS OF LEFT EYE, UNSPECIFIED ACUTE CONJUNCTIVITIS TYPE: Primary | ICD-10-CM

## 2020-09-20 PROCEDURE — G0463 HOSPITAL OUTPT CLINIC VISIT: HCPCS

## 2020-09-20 PROCEDURE — 99214 OFFICE O/P EST MOD 30 MIN: CPT | Mod: Z6 | Performed by: NURSE PRACTITIONER

## 2020-09-20 RX ORDER — OFLOXACIN 3 MG/ML
1-2 SOLUTION/ DROPS OPHTHALMIC 4 TIMES DAILY
Qty: 5 ML | Refills: 0 | Status: SHIPPED | OUTPATIENT
Start: 2020-09-20 | End: 2020-09-27

## 2020-09-20 ASSESSMENT — ENCOUNTER SYMPTOMS
EYE DISCHARGE: 1
EYE REDNESS: 1
COUGH: 0
FEVER: 0
CHILLS: 0
RHINORRHEA: 0
EYE ITCHING: 1
HEADACHES: 0
SORE THROAT: 0
SHORTNESS OF BREATH: 0

## 2020-09-20 NOTE — ED NOTES
"Patient did not present with a mask on; one was provided in triage.  Patient states \"I don't know if I will even bother with that\" encouraged for his health and staff health to please wear given mask.  "

## 2020-09-20 NOTE — ED PROVIDER NOTES
History     Chief Complaint   Patient presents with     Conjunctivitis     HPI  Mario Vance is a 35 year old male who presents for eye concern.    Eye(s) Problem  Onset: 2-3 days ago    Description:   Location: left eye  Pain: YES- burning, itching  Redness: YES    Accompanying Signs & Symptoms:  Discharge/mattering: YES- this morning could not open this morning  Swelling: no  Visual changes: no  Fever: no  Nasal Congestion: no  Bothered by bright lights: no    History:   Trauma: no   Foreign body exposure: no    Precipitating factors:   Wearing contacts: no    Alleviating factors:  Improved by: nothing    Therapies Tried and outcome: OTC pink eye drops from [employer] which helped some with burning and itching but otherwise not helpful.           Allergies:  No Known Allergies    Problem List:    Patient Active Problem List    Diagnosis Date Noted     Tobacco abuse      Priority: Medium     Morbid obesity (H) 02/21/2019     Priority: Medium        Past Medical History:    Past Medical History:   Diagnosis Date     Tobacco abuse        Past Surgical History:    Past Surgical History:   Procedure Laterality Date     AMPUTATE FINGER(S) Right 4/6/2015    Procedure: AMPUTATE FINGER(S);  Surgeon: Shakir Mcmanus MD;  Location: [facility]       Family History:    Family History   Problem Relation Age of Onset     Kidney Cancer Father      Peripheral Vascular Disease Father        Social History:  Marital Status:  Single [1]  Social History     Tobacco Use     Smoking status: Never Smoker     Smokeless tobacco: Current User     Types: Chew     Tobacco comment: chews daily    Substance Use Topics     Alcohol use: Yes     Comment: 1/2 -1 case of beer weekly     Drug use: No        Medications:    ofloxacin (OCUFLOX) 0.3 % ophthalmic solution          Review of Systems   Constitutional: Negative for chills and fever.   HENT: Positive for sneezing. Negative for congestion, ear pain, rhinorrhea and sore throat.    Eyes:  Positive for discharge, redness and itching. Negative for visual disturbance.   Respiratory: Negative for cough and shortness of breath.    Neurological: Negative for headaches.       Physical Exam   BP: (!) 161/113  Pulse: 93  Temp: 96.7  F (35.9  C)  Resp: 16  SpO2: 97 %      Physical Exam  Constitutional:       General: He is not in acute distress.     Appearance: He is not ill-appearing, toxic-appearing or diaphoretic.   HENT:      Right Ear: Tympanic membrane, ear canal and external ear normal.      Left Ear: Tympanic membrane, ear canal and external ear normal.      Nose: Nose normal.      Mouth/Throat:      Lips: Pink.      Mouth: Mucous membranes are moist.      Pharynx: Oropharynx is clear. Uvula midline.   Eyes:      General: Lids are normal.      Extraocular Movements: Extraocular movements intact.      Conjunctiva/sclera:      Right eye: Right conjunctiva is not injected. No exudate or hemorrhage.     Left eye: Left conjunctiva is injected. Exudate present. No hemorrhage.     Pupils: Pupils are equal, round, and reactive to light.   Cardiovascular:      Rate and Rhythm: Normal rate and regular rhythm.      Heart sounds: S1 normal and S2 normal. No murmur. No friction rub. No gallop.    Pulmonary:      Effort: Pulmonary effort is normal.      Breath sounds: Normal breath sounds.   Lymphadenopathy:      Cervical: No cervical adenopathy.   Skin:     General: Skin is warm and dry.      Capillary Refill: Capillary refill takes less than 2 seconds.   Neurological:      Mental Status: He is alert and oriented to person, place, and time.   Psychiatric:         Speech: Speech normal.         Behavior: Behavior is cooperative.         ED Course        Procedures                 No results found for this or any previous visit (from the past 24 hour(s)).    Medications - No data to display    Assessments & Plan (with Medical Decision Making)     I have reviewed the nursing notes.    I have reviewed the findings,  diagnosis, plan and need for follow up with the patient.  (H10.32) Acute conjunctivitis of left eye, unspecified acute conjunctivitis type  (primary encounter diagnosis)  Comment: acute, symptomatic  Plan: START ofloxacin eye drops 4 times daily (breakfast, lunch, dinner, before bed) x 7 days  Avoid rubbing, scratching, itching left eye. Wash hands if you do touch    (R03.0) Elevated blood pressure reading without diagnosis of hypertension  Plan: Recommend outpatient blood pressure monitoring and if consistently 140 or higher systolic and/or 90 or higher diastolic would recommend making appointment for further evaluation of hypertension. Hypertension can be a silent killer and can effect the cardiovascular system, kidneys, and nervous systems if left untreated.          RETURN TO THE ED WITH NEW OR WORSENING SYMPTOMS.    FOLLOW-UP WITH YOUR PRIMARY CARE PROVIDER IN 7-10 DAYS IF NO IMPROVEMENT.  FOLLOW-UP REGARDING BLOOD PRESSURE.      Genesis Baldwin CNP    Discharge Medication List as of 9/20/2020  9:32 AM      START taking these medications    Details   ofloxacin (OCUFLOX) 0.3 % ophthalmic solution Place 1-2 drops Into the left eye 4 times daily for 7 days, Disp-5 mL,R-0, E-Prescribe             Final diagnoses:   Acute conjunctivitis of left eye, unspecified acute conjunctivitis type   Elevated blood pressure reading without diagnosis of hypertension       9/20/2020   [facility]     Genesis Baldwin CNP  09/20/20 4727

## 2020-09-20 NOTE — ED AVS SNAPSHOT
[facility]  [address] [address]  Phone:  [phone]                                    Mario Vance   MRN: [id number]    Department:  [facility]   Date of Visit:  9/20/2020           After Visit Summary Signature Page    I have received my discharge instructions, and my questions have been answered. I have discussed any challenges I see with this plan with the nurse or doctor.    ..........................................................................................................................................  Patient/Patient Representative Signature      ..........................................................................................................................................  Patient Representative Print Name and Relationship to Patient    ..................................................               ................................................  Date                                   Time    ..........................................................................................................................................  Reviewed by Signature/Title    ...................................................              ..............................................  Date                                               Time          22EPIC Rev 08/18

## 2020-09-20 NOTE — DISCHARGE INSTRUCTIONS
(H10.32) Acute conjunctivitis of left eye, unspecified acute conjunctivitis type  (primary encounter diagnosis)  Comment: acute, symptomatic  Plan: START ofloxacin eye drops 4 times daily (breakfast, lunch, dinner, before bed) x 7 days  Avoid rubbing, scratching, itching left eye. Wash hands if you do touch    (R03.0) Elevated blood pressure reading without diagnosis of hypertension  Plan: Recommend outpatient blood pressure monitoring and if consistently 140 or higher systolic and/or 90 or higher diastolic would recommend making appointment for further evaluation of hypertension. Hypertension can be a silent killer and can effect the cardiovascular system, kidneys, and nervous systems if left untreated.          RETURN TO THE ED WITH NEW OR WORSENING SYMPTOMS.    FOLLOW-UP WITH YOUR PRIMARY CARE PROVIDER IN 7-10 DAYS IF NO IMPROVEMENT.  FOLLOW-UP REGARDING BLOOD PRESSURE.      Genesis Baldwin, CNP

## 2021-04-28 ENCOUNTER — TELEPHONE (OUTPATIENT)
Dept: FAMILY MEDICINE | Facility: OTHER | Age: 36
End: 2021-04-28

## 2021-04-28 RX ORDER — COLCHICINE 0.6 MG/1
0.6 TABLET ORAL DAILY
Qty: 30 TABLET | Refills: 0 | Status: CANCELLED | OUTPATIENT
Start: 2021-04-28 | End: 2021-05-28

## 2021-04-29 ENCOUNTER — OFFICE VISIT (OUTPATIENT)
Dept: FAMILY MEDICINE | Facility: OTHER | Age: 36
End: 2021-04-29
Attending: PHYSICIAN ASSISTANT
Payer: COMMERCIAL

## 2021-04-29 VITALS
WEIGHT: 268 LBS | HEART RATE: 98 BPM | HEIGHT: 66 IN | TEMPERATURE: 97.6 F | OXYGEN SATURATION: 99 % | BODY MASS INDEX: 43.07 KG/M2 | SYSTOLIC BLOOD PRESSURE: 140 MMHG | DIASTOLIC BLOOD PRESSURE: 90 MMHG

## 2021-04-29 DIAGNOSIS — M10.472 ACUTE GOUT DUE TO OTHER SECONDARY CAUSE INVOLVING TOE OF LEFT FOOT: Primary | ICD-10-CM

## 2021-04-29 DIAGNOSIS — R79.89 ELEVATED LFTS: ICD-10-CM

## 2021-04-29 LAB
ALBUMIN SERPL-MCNC: 4.1 G/DL (ref 3.4–5)
ALP SERPL-CCNC: 122 U/L (ref 40–150)
ALT SERPL W P-5'-P-CCNC: 156 U/L (ref 0–70)
ANION GAP SERPL CALCULATED.3IONS-SCNC: 5 MMOL/L (ref 3–14)
AST SERPL W P-5'-P-CCNC: 88 U/L (ref 0–45)
BASOPHILS # BLD AUTO: 0.1 10E9/L (ref 0–0.2)
BASOPHILS NFR BLD AUTO: 0.9 %
BILIRUB DIRECT SERPL-MCNC: 0.2 MG/DL (ref 0–0.2)
BILIRUB SERPL-MCNC: 0.9 MG/DL (ref 0.2–1.3)
BUN SERPL-MCNC: 16 MG/DL (ref 7–30)
CALCIUM SERPL-MCNC: 9.4 MG/DL (ref 8.5–10.1)
CHLORIDE SERPL-SCNC: 103 MMOL/L (ref 94–109)
CO2 SERPL-SCNC: 28 MMOL/L (ref 20–32)
CREAT SERPL-MCNC: 0.78 MG/DL (ref 0.66–1.25)
DIFFERENTIAL METHOD BLD: NORMAL
EOSINOPHIL # BLD AUTO: 0.1 10E9/L (ref 0–0.7)
EOSINOPHIL NFR BLD AUTO: 1.2 %
ERYTHROCYTE [DISTWIDTH] IN BLOOD BY AUTOMATED COUNT: 14.7 % (ref 10–15)
GFR SERPL CREATININE-BSD FRML MDRD: >90 ML/MIN/{1.73_M2}
GGT SERPL-CCNC: 236 U/L (ref 0–75)
GLUCOSE SERPL-MCNC: 94 MG/DL (ref 70–99)
HCT VFR BLD AUTO: 47.4 % (ref 40–53)
HGB BLD-MCNC: 16.5 G/DL (ref 13.3–17.7)
IMM GRANULOCYTES # BLD: 0 10E9/L (ref 0–0.4)
IMM GRANULOCYTES NFR BLD: 0.4 %
LYMPHOCYTES # BLD AUTO: 1.2 10E9/L (ref 0.8–5.3)
LYMPHOCYTES NFR BLD AUTO: 18.1 %
MCH RBC QN AUTO: 28.8 PG (ref 26.5–33)
MCHC RBC AUTO-ENTMCNC: 34.8 G/DL (ref 31.5–36.5)
MCV RBC AUTO: 83 FL (ref 78–100)
MONOCYTES # BLD AUTO: 0.6 10E9/L (ref 0–1.3)
MONOCYTES NFR BLD AUTO: 9.6 %
NEUTROPHILS # BLD AUTO: 4.7 10E9/L (ref 1.6–8.3)
NEUTROPHILS NFR BLD AUTO: 69.8 %
NRBC # BLD AUTO: 0 10*3/UL
NRBC BLD AUTO-RTO: 0 /100
PLATELET # BLD AUTO: 228 10E9/L (ref 150–450)
POTASSIUM SERPL-SCNC: 4 MMOL/L (ref 3.4–5.3)
PROT SERPL-MCNC: 8.2 G/DL (ref 6.8–8.8)
RBC # BLD AUTO: 5.72 10E12/L (ref 4.4–5.9)
SODIUM SERPL-SCNC: 136 MMOL/L (ref 133–144)
URATE SERPL-MCNC: 7.8 MG/DL (ref 3.5–7.2)
WBC # BLD AUTO: 6.7 10E9/L (ref 4–11)

## 2021-04-29 PROCEDURE — 80048 BASIC METABOLIC PNL TOTAL CA: CPT | Performed by: PHYSICIAN ASSISTANT

## 2021-04-29 PROCEDURE — 36415 COLL VENOUS BLD VENIPUNCTURE: CPT | Performed by: PHYSICIAN ASSISTANT

## 2021-04-29 PROCEDURE — 99213 OFFICE O/P EST LOW 20 MIN: CPT | Performed by: PHYSICIAN ASSISTANT

## 2021-04-29 PROCEDURE — 82977 ASSAY OF GGT: CPT | Performed by: PHYSICIAN ASSISTANT

## 2021-04-29 PROCEDURE — 80076 HEPATIC FUNCTION PANEL: CPT | Performed by: PHYSICIAN ASSISTANT

## 2021-04-29 PROCEDURE — 85025 COMPLETE CBC W/AUTO DIFF WBC: CPT | Performed by: PHYSICIAN ASSISTANT

## 2021-04-29 PROCEDURE — 84550 ASSAY OF BLOOD/URIC ACID: CPT | Performed by: PHYSICIAN ASSISTANT

## 2021-04-29 RX ORDER — ALLOPURINOL 100 MG/1
100 TABLET ORAL AT BEDTIME
Qty: 30 TABLET | Refills: 3 | Status: SHIPPED | OUTPATIENT
Start: 2021-04-29 | End: 2022-07-12

## 2021-04-29 RX ORDER — COLCHICINE 0.6 MG/1
TABLET ORAL
Qty: 30 TABLET | Refills: 3 | Status: SHIPPED | OUTPATIENT
Start: 2021-04-29 | End: 2022-07-12

## 2021-04-29 RX ORDER — COLCHICINE 0.6 MG/1
TABLET ORAL
COMMUNITY
Start: 2020-08-13 | End: 2021-04-29

## 2021-04-29 RX ORDER — INDOMETHACIN 50 MG/1
50 CAPSULE ORAL
Qty: 45 CAPSULE | Refills: 3 | Status: SHIPPED | OUTPATIENT
Start: 2021-04-29 | End: 2022-07-12

## 2021-04-29 RX ORDER — INDOMETHACIN 50 MG/1
50 CAPSULE ORAL
COMMUNITY
End: 2021-04-29

## 2021-04-29 ASSESSMENT — ANXIETY QUESTIONNAIRES
3. WORRYING TOO MUCH ABOUT DIFFERENT THINGS: NOT AT ALL
2. NOT BEING ABLE TO STOP OR CONTROL WORRYING: NOT AT ALL
1. FEELING NERVOUS, ANXIOUS, OR ON EDGE: NOT AT ALL
4. TROUBLE RELAXING: NOT AT ALL
5. BEING SO RESTLESS THAT IT IS HARD TO SIT STILL: NOT AT ALL
7. FEELING AFRAID AS IF SOMETHING AWFUL MIGHT HAPPEN: NOT AT ALL
GAD7 TOTAL SCORE: 0
6. BECOMING EASILY ANNOYED OR IRRITABLE: NOT AT ALL

## 2021-04-29 ASSESSMENT — MIFFLIN-ST. JEOR: SCORE: 2098.39

## 2021-04-29 ASSESSMENT — PAIN SCALES - GENERAL: PAINLEVEL: WORST PAIN (10)

## 2021-04-29 ASSESSMENT — PATIENT HEALTH QUESTIONNAIRE - PHQ9: SUM OF ALL RESPONSES TO PHQ QUESTIONS 1-9: 0

## 2021-04-29 NOTE — PROGRESS NOTES
Subjective     Mario Vance is a 35 year old male who presents to clinic today for the following health issues:    HPI         Concern - Gout Attack  Onset: a few days  Description: left foot big toe  Intensity: 10/10  Progression of Symptoms:  worsening  Accompanying Signs & Symptoms: can hardly walk  Previous history of similar problem: yes  Precipitating factors:        Worsened by: none  Alleviating factors:        Improved by: by taking either indocin or colchicine   Therapies tried and outcome: None        Patient Active Problem List   Diagnosis     Morbid obesity (H)     Tobacco abuse     Past Surgical History:   Procedure Laterality Date     AMPUTATE FINGER(S) Right 4/6/2015    Procedure: AMPUTATE FINGER(S);  Surgeon: Shakir Mcmanus MD;  Location: [facility]       Social History     Tobacco Use     Smoking status: Never Smoker     Smokeless tobacco: Current User     Types: Chew     Tobacco comment: chews daily    Substance Use Topics     Alcohol use: Yes     Comment: 1/2 -1 case of beer weekly     Family History   Problem Relation Age of Onset     Kidney Cancer Father      Peripheral Vascular Disease Father          Current Outpatient Medications   Medication Sig Dispense Refill     allopurinol (ZYLOPRIM) 100 MG tablet Take 1 tablet (100 mg) by mouth At Bedtime 30 tablet 3     colchicine (COLCYRS) 0.6 MG tablet Take one pill every 2 hours to max of 6 pills a day  Or diarrhea develo 30 tablet 3     indomethacin (INDOCIN) 50 MG capsule Take 1 capsule (50 mg) by mouth 3 times daily (with meals) 45 capsule 3     No Known Allergies  Recent Labs   Lab Test 02/03/20  1006 03/05/19  0816 01/21/19 01/17/19   LDL  --  108*  --  166.7   HDL  --  31* 66.5 66.5   TRIG  --  291* 144 144   ALT  --  88* 258* 258*   CR 0.77 0.75 0.78 0.78   GFRESTIMATED >90 >90  --   --    GFRESTBLACK >90 >90  --   --    POTASSIUM 3.4 3.9  --   --    TSH  --  3.85  --   --       BP Readings from Last 3 Encounters:   04/29/21 (!) 140/90  "  09/20/20 146/96   02/03/20 (!) 159/104    Wt Readings from Last 3 Encounters:   04/29/21 121.6 kg (268 lb)   02/03/20 113.4 kg (250 lb)   03/05/19 120.2 kg (265 lb)                    Reviewed and updated as needed this visit by Provider                Review of Systems   Constitutional, HEENT, cardiovascular, pulmonary, gi and gu systems are negative, except as otherwise noted.      Objective    Blood Pressure (Abnormal) 140/90 (BP Location: Left arm, Patient Position: Sitting, Cuff Size: Adult Regular)   Pulse 98   Temperature 97.6  F (36.4  C) (Tympanic)   Height 1.676 m (5' 6\")   Weight 121.6 kg (268 lb)   Oxygen Saturation 99%   Body Mass Index 43.26 kg/m    Body mass index is 43.26 kg/m .  Physical Exam   GENERAL: healthy, alert and no distress  MS: swelling and pain in left mcp.  Warm to touch.   SKIN: skin is red .    NEURO: Normal strength and tone, sensory exam grossly normal and mentation intact  PSYCH: mentation appears normal, affect normal/bright    Diagnostic Test Results:  Labs reviewed in Epic        Assessment & Plan     Acute gout due to other secondary cause involving toe of left foot  He is going to be given allopurinol. We will check his uric acid level. Goal of 6 or less.  Doesn't need to have elevation but is his 4 bout in less than 1/12 year   - Basic metabolic panel  - Uric acid  - CBC with platelets differential  - allopurinol (ZYLOPRIM) 100 MG tablet; Take 1 tablet (100 mg) by mouth At Bedtime  - indomethacin (INDOCIN) 50 MG capsule; Take 1 capsule (50 mg) by mouth 3 times daily (with meals)  - Uric acid; Future  - colchicine (COLCYRS) 0.6 MG tablet; Take one pill every 2 hours to max of 6 pills a day  Or diarrhea develo    Elevated LFTs  ETOH , goes for 6 months without his medications.    - Hepatic panel (Albumin, ALT, AST, Bili, Alk Phos, TP)  - GGT     Tobacco Cessation:   reports that he has never smoked. His smokeless tobacco use includes chew.        BMI:   Estimated body " "mass index is 43.26 kg/m  as calculated from the following:    Height as of this encounter: 1.676 m (5' 6\").    Weight as of this encounter: 121.6 kg (268 lb).   Weight management plan: Discussed healthy diet and exercise guidelines         See Patient Instructions    No follow-ups on file.    HERI El  [facility] [place]  "

## 2021-04-30 ASSESSMENT — ANXIETY QUESTIONNAIRES: GAD7 TOTAL SCORE: 0

## 2021-08-02 ENCOUNTER — NURSE TRIAGE (OUTPATIENT)
Dept: FAMILY MEDICINE | Facility: OTHER | Age: 36
End: 2021-08-02

## 2021-08-02 ENCOUNTER — HOSPITAL ENCOUNTER (EMERGENCY)
Facility: HOSPITAL | Age: 36
Discharge: HOME | End: 2021-08-02
Attending: NURSE PRACTITIONER | Admitting: NURSE PRACTITIONER
Payer: COMMERCIAL

## 2021-08-02 VITALS
RESPIRATION RATE: 18 BRPM | OXYGEN SATURATION: 98 % | DIASTOLIC BLOOD PRESSURE: 98 MMHG | SYSTOLIC BLOOD PRESSURE: 162 MMHG | HEART RATE: 100 BPM | TEMPERATURE: 98.3 F

## 2021-08-02 DIAGNOSIS — H10.32 ACUTE CONJUNCTIVITIS OF LEFT EYE, UNSPECIFIED ACUTE CONJUNCTIVITIS TYPE: Primary | ICD-10-CM

## 2021-08-02 DIAGNOSIS — R03.0 ELEVATED BLOOD PRESSURE READING WITHOUT DIAGNOSIS OF HYPERTENSION: ICD-10-CM

## 2021-08-02 PROCEDURE — 99213 OFFICE O/P EST LOW 20 MIN: CPT | Performed by: NURSE PRACTITIONER

## 2021-08-02 PROCEDURE — G0463 HOSPITAL OUTPT CLINIC VISIT: HCPCS

## 2021-08-02 ASSESSMENT — ENCOUNTER SYMPTOMS
DIZZINESS: 0
EYE REDNESS: 1
MYALGIAS: 0
EYE DISCHARGE: 1
EYE PAIN: 1
CHILLS: 0
PALPITATIONS: 0
PHOTOPHOBIA: 0
HEADACHES: 0
ARTHRALGIAS: 0
SORE THROAT: 0
COUGH: 0
SHORTNESS OF BREATH: 0
LIGHT-HEADEDNESS: 0
FEVER: 0
RHINORRHEA: 0
EYE ITCHING: 1

## 2021-08-02 NOTE — DISCHARGE INSTRUCTIONS
(H10.32) Acute conjunctivitis of left eye, unspecified acute conjunctivitis type  (primary encounter diagnosis)  Comment: acute, symptomatic  Plan: Avoid rubbing, irritating eye. If you do touch ensure good hand hygiene.  - START gentamicin drops 1-2 drops in left eye four times daily x 7 days     (R03.0) Elevated blood pressure reading without diagnosis of hypertension  Comment: new  Plan: Recommend outpatient blood pressure monitoring and if consistently 140 or higher systolic and/or 90 or higher diastolic would recommend making appointment for further evaluation of hypertension. Hypertension can be a silent killer and can effect the cardiovascular system, kidneys, and nervous systems if left untreated.        RETURN TO THE ED WITH NEW OR WORSENING SYMPTOMS.    FOLLOW-UP WITH YOUR PRIMARY CARE PROVIDER IN 7-10 DAYS.      Genesis Baldwin, CNP

## 2021-08-02 NOTE — TELEPHONE ENCOUNTER
"Writer offered last open appt today at 3 PM and or appt tomorrow, pt doesn't want to wait until tomorrow stated he is going to [facility] instead.     Reason for Disposition    Eyelid is red and painful (or tender to touch)    Additional Information    Negative: Eye exposure to chemical or fumes    Negative: Redness of white of eye (sclera), but no pus or only a small amount of brief pus    Negative: SEVERE eye pain (e.g., excruciating)    Negative: [1] Eyelids are very swollen (shut or almost) AND [2] fever    Negative: [1] Eyelid (outer) is very red (or tender to touch) AND [2] fever    Negative: Patient sounds very sick or weak to the triager    Negative: MODERATE eye pain (e.g., interferes with normal activities)    Negative: Fever > 104 F (40 C)    Negative: Cloudy spot or sore seen on the cornea (clear part of the eye)    Negative: Blurred vision    Negative: Eye is very swollen (shut or almost)    Negative: [1] MILD eye pain or discomfort AND [2] wears contacts    Answer Assessment - Initial Assessment Questions  1. EYE DISCHARGE: \"Is the discharge in one or both eyes?\" \"What color is it?\" \"How much is there?\" \"When did the discharge start?\"       Left eye  2. REDNESS OF SCLERA: \"Is the redness in one or both eyes?\" \"When did the redness start?\"       Redness and clear discharge and itching  3. EYELIDS: \"Are the eyelids red or swollen?\" If so, ask: \"How much?\"       Redness and swelling  4. VISION: \"Is there any difficulty seeing clearly?\"       Mild vision changes  5. PAIN: \"Is there any pain? If so, ask: \"How bad is it?\" (Scale 1-10; or mild, moderate, severe)     - MILD (1-3): doesn't interfere with normal activities      - MODERATE (4-7): interferes with normal activities or awakens from sleep     - SEVERE (8-10): excruciating pain, unable to do any normal activities        mild  6. CONTACT LENS: \"Do you wear contacts?\"      no  7. OTHER SYMPTOMS: \"Do you have any other symptoms?\" (e.g., fever, runny nose, " "cough)      no  8. PREGNANCY: \"Is there any chance you are pregnant?\" \"When was your last menstrual period?\"      n/a    Protocols used: EYE - PUS OR LCPYRNCPR-E-OF      "

## 2021-08-02 NOTE — ED TRIAGE NOTES
Pt presents today with left eye irritation. Eye is swollen and watery. Pt states when he woke up it was puffy and crusted. Reports he was exposed Saturday at a birthday party. States it is not affecting his vision but it does sometime get blurry. Sx started yesterday and sx got worse this morning. Pt has not used any otc meds.

## 2021-08-02 NOTE — ED PROVIDER NOTES
History     Chief Complaint   Patient presents with     Conjunctivitis     exposed saturday. LT eye     HPI  Mario Vance is a 35 year old male who presents ambulatory for evaluation of eye concerns.    Eye(s) Problem  Onset: yesterday- Exposed to a 2 year old on Saturday    Description:   Location: left eye  Pain: no but really itchy yesterday  Redness: YES    Accompanying Signs & Symptoms:  Discharge/mattering: YES- crusty this morning, watery   Swelling: YES  Visual changes: no  Fever: no  Nasal Congestion: YES- stuffy nose  Bothered by bright lights: no    History:   Trauma: no   Foreign body exposure: no    Precipitating factors:   Wearing contacts: no    Alleviating factors:  Improved by: no    Therapies Tried and outcome: nothing               Allergies:  No Known Allergies    Problem List:    Patient Active Problem List    Diagnosis Date Noted     Tobacco abuse      Priority: Medium     Morbid obesity (H) 02/21/2019     Priority: Medium        Past Medical History:    Past Medical History:   Diagnosis Date     Tobacco abuse        Past Surgical History:    Past Surgical History:   Procedure Laterality Date     AMPUTATE FINGER(S) Right 4/6/2015    Procedure: AMPUTATE FINGER(S);  Surgeon: Shakir Mcmanus MD;  Location: [facility]       Family History:    Family History   Problem Relation Age of Onset     Kidney Cancer Father      Peripheral Vascular Disease Father        Social History:  Marital Status:  Single [1]  Social History     Tobacco Use     Smoking status: Never Smoker     Smokeless tobacco: Current User     Types: Chew     Tobacco comment: chews daily    Substance Use Topics     Alcohol use: Yes     Comment: 1/2 -1 case of beer weekly     Drug use: No        Medications:    gentamicin (GARAMICIN) 15mg/ml compounded ophthalmic solution  allopurinol (ZYLOPRIM) 100 MG tablet  colchicine (COLCYRS) 0.6 MG tablet  indomethacin (INDOCIN) 50 MG capsule          Review of Systems   Constitutional:  Negative for chills and fever.   HENT: Positive for congestion. Negative for ear pain, rhinorrhea and sore throat.    Eyes: Positive for pain, discharge, redness and itching. Negative for photophobia and visual disturbance.   Respiratory: Negative for cough and shortness of breath.    Cardiovascular: Negative for chest pain and palpitations.   Musculoskeletal: Negative for arthralgias and myalgias.   Skin: Negative for rash.   Neurological: Negative for dizziness, light-headedness and headaches.       Physical Exam   BP: (!) 141/115  Pulse: 100  Temp: 98.3  F (36.8  C)  Resp: 18  SpO2: 98 %      Physical Exam  Constitutional:       General: He is not in acute distress.     Appearance: Normal appearance. He is not ill-appearing or toxic-appearing.   HENT:      Head: Normocephalic.      Right Ear: Tympanic membrane, ear canal and external ear normal.      Left Ear: Tympanic membrane, ear canal and external ear normal.      Nose: Congestion present.      Mouth/Throat:      Lips: Pink.      Mouth: Mucous membranes are moist.      Pharynx: Oropharynx is clear. Uvula midline.   Eyes:      General: Lids are normal.      Extraocular Movements: Extraocular movements intact.      Conjunctiva/sclera:      Right eye: Right conjunctiva is not injected. No exudate or hemorrhage.     Left eye: Left conjunctiva is injected. Exudate present. No hemorrhage.     Pupils: Pupils are equal, round, and reactive to light.   Cardiovascular:      Rate and Rhythm: Normal rate and regular rhythm.      Heart sounds: S1 normal and S2 normal. No murmur heard.   No friction rub. No gallop.    Pulmonary:      Effort: Pulmonary effort is normal.      Breath sounds: Normal breath sounds.   Musculoskeletal:      Cervical back: Neck supple.      Right lower leg: No edema.      Left lower leg: No edema.   Lymphadenopathy:      Cervical: No cervical adenopathy.   Skin:     General: Skin is warm and dry.      Coloration: Skin is not pale.   Neurological:       Mental Status: He is alert and oriented to person, place, and time.      Gait: Gait is intact.   Psychiatric:         Mood and Affect: Mood normal.         Behavior: Behavior is cooperative.         ED Course        Procedures         No results found for this or any previous visit (from the past 24 hour(s)).    Medications - No data to display    Assessments & Plan (with Medical Decision Making)     I have reviewed the nursing notes.    I have reviewed the findings, diagnosis, plan and need for follow up with the patient.  (H10.32) Acute conjunctivitis of left eye, unspecified acute conjunctivitis type  (primary encounter diagnosis)  Comment: acute, symptomatic  Plan: Avoid rubbing, irritating eye. If you do touch ensure good hand hygiene.  - START gentamicin drops 1-2 drops in left eye four times daily x 7 days     (R03.0) Elevated blood pressure reading without diagnosis of hypertension  Comment: new  Plan: Recommend outpatient blood pressure monitoring and if consistently 140 or higher systolic and/or 90 or higher diastolic would recommend making appointment for further evaluation of hypertension. Hypertension can be a silent killer and can effect the cardiovascular system, kidneys, and nervous systems if left untreated.        RETURN TO THE ED WITH NEW OR WORSENING SYMPTOMS.    FOLLOW-UP WITH YOUR PRIMARY CARE PROVIDER IN 7-10 DAYS.      Genesis Baldwin CNP    Discharge Medication List as of 8/2/2021  4:05 PM      START taking these medications    Details   gentamicin (GARAMICIN) 15mg/ml compounded ophthalmic solution Place 2 drops Into the left eye 4 times daily for 7 days, Disp-2.8 mL, R-0, E-Prescribe             Final diagnoses:   Acute conjunctivitis of left eye, unspecified acute conjunctivitis type   Elevated blood pressure reading without diagnosis of hypertension       8/2/2021   [facility]     Genesis Baldwin CNP  08/02/21 [id number]

## 2022-03-09 ENCOUNTER — TELEPHONE (OUTPATIENT)
Dept: FAMILY MEDICINE | Facility: OTHER | Age: 37
End: 2022-03-09
Payer: COMMERCIAL

## 2022-03-09 NOTE — TELEPHONE ENCOUNTER
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 Carvalho.    Was an appointment offered for this call? Yes  If yes : Appointment type              Date may 31st but too far out    Preferred method for responding to this message: Telephone Call  What is your phone number ? [phone]    If we cannot reach you directly, may we leave a detailed response at the number you provided? Yes    Can this message wait until your PCP/provider returns, if unavailable today? YES    Janee Nance

## 2022-03-14 ENCOUNTER — OFFICE VISIT (OUTPATIENT)
Dept: FAMILY MEDICINE | Facility: OTHER | Age: 37
End: 2022-03-14
Payer: COMMERCIAL

## 2022-03-14 ENCOUNTER — CARE COORDINATION (OUTPATIENT)
Dept: CARE COORDINATION | Facility: OTHER | Age: 37
End: 2022-03-14

## 2022-03-14 VITALS
RESPIRATION RATE: 18 BRPM | WEIGHT: 270 LBS | BODY MASS INDEX: 43.39 KG/M2 | DIASTOLIC BLOOD PRESSURE: 88 MMHG | HEIGHT: 66 IN | OXYGEN SATURATION: 97 % | SYSTOLIC BLOOD PRESSURE: 142 MMHG | TEMPERATURE: 98.3 F | HEART RATE: 106 BPM

## 2022-03-14 DIAGNOSIS — F43.10 PTSD (POST-TRAUMATIC STRESS DISORDER): ICD-10-CM

## 2022-03-14 DIAGNOSIS — F19.20 CHEMICAL DEPENDENCY (H): ICD-10-CM

## 2022-03-14 DIAGNOSIS — F10.10 ALCOHOL ABUSE: ICD-10-CM

## 2022-03-14 DIAGNOSIS — R03.0 ELEVATED BLOOD PRESSURE READING WITHOUT DIAGNOSIS OF HYPERTENSION: ICD-10-CM

## 2022-03-14 DIAGNOSIS — G47.9 SLEEP DISORDER: Primary | ICD-10-CM

## 2022-03-14 DIAGNOSIS — F41.1 GENERALIZED ANXIETY DISORDER: ICD-10-CM

## 2022-03-14 LAB
ALBUMIN SERPL-MCNC: 4 G/DL (ref 3.4–5)
ALP SERPL-CCNC: 98 U/L (ref 40–150)
ALT SERPL W P-5'-P-CCNC: 108 U/L (ref 0–70)
ANION GAP SERPL CALCULATED.3IONS-SCNC: 7 MMOL/L (ref 3–14)
AST SERPL W P-5'-P-CCNC: 71 U/L (ref 0–45)
BILIRUB DIRECT SERPL-MCNC: 0.1 MG/DL (ref 0–0.2)
BILIRUB SERPL-MCNC: 0.4 MG/DL (ref 0.2–1.3)
BUN SERPL-MCNC: 7 MG/DL (ref 7–30)
CALCIUM SERPL-MCNC: 8.9 MG/DL (ref 8.5–10.1)
CHLORIDE BLD-SCNC: 104 MMOL/L (ref 94–109)
CO2 SERPL-SCNC: 28 MMOL/L (ref 20–32)
CREAT SERPL-MCNC: 0.87 MG/DL (ref 0.66–1.25)
GFR SERPL CREATININE-BSD FRML MDRD: >90 ML/MIN/1.73M2
GLUCOSE BLD-MCNC: 93 MG/DL (ref 70–99)
POTASSIUM BLD-SCNC: 3.7 MMOL/L (ref 3.4–5.3)
PROT SERPL-MCNC: 7.9 G/DL (ref 6.8–8.8)
SODIUM SERPL-SCNC: 139 MMOL/L (ref 133–144)
TSH SERPL DL<=0.005 MIU/L-ACNC: 1.43 MU/L (ref 0.4–4)

## 2022-03-14 PROCEDURE — 84443 ASSAY THYROID STIM HORMONE: CPT

## 2022-03-14 PROCEDURE — 36415 COLL VENOUS BLD VENIPUNCTURE: CPT

## 2022-03-14 PROCEDURE — 82248 BILIRUBIN DIRECT: CPT

## 2022-03-14 PROCEDURE — 80053 COMPREHEN METABOLIC PANEL: CPT

## 2022-03-14 PROCEDURE — 99214 OFFICE O/P EST MOD 30 MIN: CPT

## 2022-03-14 RX ORDER — VENLAFAXINE HYDROCHLORIDE 37.5 MG/1
37.5 CAPSULE, EXTENDED RELEASE ORAL DAILY
Qty: 7 CAPSULE | Refills: 0 | Status: SHIPPED | OUTPATIENT
Start: 2022-03-14 | End: 2022-04-12

## 2022-03-14 RX ORDER — VENLAFAXINE HYDROCHLORIDE 75 MG/1
75 CAPSULE, EXTENDED RELEASE ORAL DAILY
Qty: 30 CAPSULE | Refills: 0 | Status: SHIPPED | OUTPATIENT
Start: 2022-03-14 | End: 2022-04-12

## 2022-03-14 RX ORDER — VENLAFAXINE HYDROCHLORIDE 37.5 MG/1
37.5 TABLET, EXTENDED RELEASE ORAL DAILY
Qty: 30 TABLET | Refills: 0 | Status: CANCELLED | OUTPATIENT
Start: 2022-03-14

## 2022-03-14 RX ORDER — SERTRALINE HYDROCHLORIDE 25 MG/1
25 TABLET, FILM COATED ORAL DAILY
Status: CANCELLED | OUTPATIENT
Start: 2022-03-14

## 2022-03-14 ASSESSMENT — PATIENT HEALTH QUESTIONNAIRE - PHQ9: SUM OF ALL RESPONSES TO PHQ QUESTIONS 1-9: 18

## 2022-03-14 ASSESSMENT — ANXIETY QUESTIONNAIRES
IF YOU CHECKED OFF ANY PROBLEMS ON THIS QUESTIONNAIRE, HOW DIFFICULT HAVE THESE PROBLEMS MADE IT FOR YOU TO DO YOUR WORK, TAKE CARE OF THINGS AT HOME, OR GET ALONG WITH OTHER PEOPLE: EXTREMELY DIFFICULT
4. TROUBLE RELAXING: NEARLY EVERY DAY
GAD7 TOTAL SCORE: 21
2. NOT BEING ABLE TO STOP OR CONTROL WORRYING: NEARLY EVERY DAY
7. FEELING AFRAID AS IF SOMETHING AWFUL MIGHT HAPPEN: NEARLY EVERY DAY
5. BEING SO RESTLESS THAT IT IS HARD TO SIT STILL: NEARLY EVERY DAY
1. FEELING NERVOUS, ANXIOUS, OR ON EDGE: NEARLY EVERY DAY
6. BECOMING EASILY ANNOYED OR IRRITABLE: NEARLY EVERY DAY
3. WORRYING TOO MUCH ABOUT DIFFERENT THINGS: NEARLY EVERY DAY

## 2022-03-14 ASSESSMENT — PAIN SCALES - GENERAL: PAINLEVEL: NO PAIN (0)

## 2022-03-14 NOTE — NURSING NOTE
"Chief Complaint   Patient presents with     Sleep Problem       Initial BP (!) 142/88 (BP Location: Left arm, Patient Position: Sitting, Cuff Size: Adult Large)   Pulse 106   Temp 98.3  F (36.8  C) (Tympanic)   Resp 18   Ht 1.676 m (5' 6\")   Wt 122.5 kg (270 lb)   SpO2 97%   BMI 43.58 kg/m   Estimated body mass index is 43.58 kg/m  as calculated from the following:    Height as of this encounter: 1.676 m (5' 6\").    Weight as of this encounter: 122.5 kg (270 lb).  Medication Reconciliation: complete  Genesis Norris LPN  "

## 2022-03-14 NOTE — PROGRESS NOTES
Assessment & Plan     Sleep disorder/Insomnia  Orders placed for sleep study. Discussed the effect of alcohol on sleep with patient. Encouraged him to try melatonin. Discussed that this writer is not comfortable prescribing a medication for sleep given his significant ETOH use. Could consider sleep medications in the future.   - Adult Sleep Eval & Management  Referral    Alcohol abuse/Chemical dependency (H)  Significant history of ETOH use which has recently worsened while he has been off of work for the past month. Long, detailed discussion with patient today regarding his substance use and the associated risks. Unclear the daily quantity patient is actually consuming. Encouraged patient to try to cut back 50% on his alcohol consumption over the next month. He is interested in enrolling in an outpatient program and we will try to arrange this. [facility] also a [facility] which he discussed with [facility].   - Hepatic panel (Albumin, ALT, AST, Bili, Alk Phos, TP)  - Basic metabolic panel  - Adult Mental Health  Referral  - Adult Mental Health  Referral    PTSD (post-traumatic stress disorder)/Generalized anxiety disorder  Previously in the  and reports ongoing anxiety/PTSD. Has never discussed mental health concerns with anyone. Patient is anxious and pacing throughout appointment. Nkechi,  in to visit with patient as well, see her note. Patient is interested in starting counseling and a program for chemical dependency. We will try to get [facility] initiated with [facility] if possible as patient resides in [place]. Discussed trial of effexor and patient is agreeable. Will start with 37.5 mg for the first week and then may increase to 75 mg pill. Patient hesitant about starting after this writer discussed side effects/erectile dysfunction. He will try for now and will contact us with concerns. Can consider wellbutrin if ED occurs. Also discussed that he must take this  "medication daily and not miss doses. Follow-up in 1 month or sooner with any questions/concerns.  - venlafaxine (EFFEXOR-XR) 75 MG 24 hr capsule  Dispense: 30 capsule; Refill: 0  - venlafaxine (EFFEXOR-XR) 37.5 MG 24 hr capsule  Dispense: 7 capsule; Refill: 0  - Adult Mental Health  Referral  - Adult Mental Health  Referral  - TSH with free T4 reflex    Elevated blood pressure reading without diagnosis of hypertension  Borderline reading today. Discussed home blood pressure monitoring. Patient will record results over the next month and bring to follow-up. Consider addition of amlodipine.   - Home Blood Pressure Monitor Order for DME - ONLY FOR DME    [id number]}     Tobacco Cessation:   reports that he has never smoked. His smokeless tobacco use includes chew.  Tobacco Cessation Action Plan: Information offered: Patient not interested at this time    BMI:   Estimated body mass index is 43.58 kg/m  as calculated from the following:    Height as of this encounter: 1.676 m (5' 6\").    Weight as of this encounter: 122.5 kg (270 lb).   Weight management plan: Discussed healthy diet and exercise guidelines      Return in about 1 month (around 4/14/2022) for Follow up, sleep, mood.    CHANDRA Casillas [facility] - ARPITA Ascencio is a 36 year old who presents for the following health issues   HPI     Concern - Sleep apnea  Onset: Chronic  Description: Heavy snoring, wakes up in panic grasping for breath  Intensity: moderate  Progression of Symptoms:  worsening  Accompanying Signs & Symptoms: Tired during the day  Previous history of similar problem: Yes  Precipitating factors:        Worsened by: NA  Alleviating factors:        Improved by: NA  Therapies tried and outcome:  none     Depression and Anxiety Follow-Up    How are you doing with your depression since your last visit? Worsened     How are you doing with your anxiety since your last visit?  Worsened     Are you " having other symptoms that might be associated with depression or anxiety? Yes:  Panic attacks    Have you had a significant life event? Grief or Loss and Health Concerns     Do you have any concerns with your use of alcohol or other drugs? Yes:  Drinking daily    Social History     Tobacco Use     Smoking status: Never Smoker     Smokeless tobacco: Current User     Types: Chew     Tobacco comment: chews daily    Substance Use Topics     Alcohol use: Yes     Comment: 1/2 -1 case of beer weekly     Drug use: No     PHQ 2/19/2019 4/29/2021 3/14/2022   PHQ-9 Total Score 0 0 18   Q9: Thoughts of better off dead/self-harm past 2 weeks Not at all Not at all Not at all     KACEY-7 SCORE 2/19/2019 4/29/2021 3/14/2022   Total Score 0 0 21     Last PHQ-9 3/14/2022   1.  Little interest or pleasure in doing things 3   2.  Feeling down, depressed, or hopeless 3   3.  Trouble falling or staying asleep, or sleeping too much 3   4.  Feeling tired or having little energy 3   5.  Poor appetite or overeating 1   6.  Feeling bad about yourself 3   7.  Trouble concentrating 2   8.  Moving slowly or restless 0   Q9: Thoughts of better off dead/self-harm past 2 weeks 0   PHQ-9 Total Score 18   Difficulty at work, home, or with people Very difficult     KACEY-7  3/14/2022   1. Feeling nervous, anxious, or on edge 3   2. Not being able to stop or control worrying 3   3. Worrying too much about different things 3   4. Trouble relaxing 3   5. Being so restless that it is hard to sit still 3   6. Becoming easily annoyed or irritable 3   7. Feeling afraid, as if something awful might happen 3   KACEY-7 Total Score 21   If you checked any problems, how difficult have they made it for you to do your work, take care of things at home, or get along with other people? Extremely difficult       Suicide Assessment Five-step Evaluation and Treatment (SAFE-T)    How many servings of fruits and vegetables do you eat daily?  0-1    On average, how many  "sweetened beverages do you drink each day (Examples: soda, juice, sweet tea, etc.  Do NOT count diet or artificially sweetened beverages)?   1    How many days per week do you exercise enough to make your heart beat faster? 3 or less    How many minutes a day do you exercise enough to make your heart beat faster? 9 or less    How many days per week do you miss taking your medication? 0    R.L. is a 36-year-old male who presents today by self with concerns of sleep apnea. He states he recently got a DOT physical and the provider told him he might have sleep apnea due to his reported snoring, poor sleep, and the large circumference of his neck. He states he sleep 3-4 hours per night and will at times notice himself waking up gasping. He tells me that he was \"extremely anxious\" for this appointment today and did not want to come, however his friend \"forced\" him into the car. He states that for the past month he has been laid off from his job as a . Reports he has consumed alcohol daily for many years, however since being off of work this consumption has increased. States yesterday he drank approximately 1.5 cases of beer, a 12-pack of sweet teas, and \"many\" shots at the bar. He reports he drinks at least a case of beer daily as he is bored while not working. When he is working, he reports he drinks 3-5 beers daily.  States he has never withdrawn from alcohol in the past. He went to a rehab program many years ago, but has not had any help since. He tells me his reason for drinking is so that he can sleep better and feel less anxious. He was previously in the  and served in [place]. Reports he has many flashbacks from this but this isn't something he has discussed with anyone.  He admits to chewing daily. Denies any illicit drug use. Denies any suicidal ideation. His mental health is his biggest concern today. He tells me he wants a better life, but does not know where to start.    Review of " "Systems   Constitutional, HEENT, cardiovascular, pulmonary, GI, , musculoskeletal, neuro, skin, endocrine and psych systems are negative, except as otherwise noted.      Objective    BP (!) 142/88 (BP Location: Left arm, Patient Position: Sitting, Cuff Size: Adult Large)   Pulse 106   Temp 98.3  F (36.8  C) (Tympanic)   Resp 18   Ht 1.676 m (5' 6\")   Wt 122.5 kg (270 lb)   SpO2 97%   BMI 43.58 kg/m    Body mass index is 43.58 kg/m .     Physical Exam   GENERAL: obese 36-year-old male pacing around exam room, alert and no distress  NECK: no adenopathy, no asymmetry, masses, or scars and thyroid normal to palpation  RESP: lungs clear to auscultation - no rales, rhonchi or wheezes  CV: regular rate and rhythm, normal S1 S2, no S3 or S4, no murmur, click or rub, no peripheral edema and peripheral pulses strong  ABDOMEN: soft, nontender, no hepatosplenomegaly, no masses and bowel sounds normal  MS: no gross musculoskeletal defects noted, no edema  PSYCH: anxious, cooperative          "

## 2022-03-14 NOTE — PATIENT INSTRUCTIONS
Start with taking the Effexor 37.5 mg for the first week. Then may change to the 75 mg dose.     Follow up in 1 month or sooner with questions or concerns.    We will try to get you set up with [facility].     Try to work on decreasing your alcohol intake.

## 2022-03-14 NOTE — PROGRESS NOTES
Care Transitions focused note:      Called patient to see if he had heard from [facility] in [place].  Patient states they called him this morning and left a message.  He will be calling them back.  Still waiting to hear from the Sleep folks.        Care Transitions focused note:      Met with patient at the request of LEILA Newton.  Patient is drinking a lot of alcohol, is currently laid off from his job with nothing to do and has PTSD and anxiety from serving in the [employer] in [place].  This SW could definitely see that patient was anxious about being at this appointment.    Had a good conversation with patient about how things work between mental health and chemical dependency and that it is really difficult to to just address one and not the other because they are both contributing to the problem (like a dance).  Et patiet know we would put in referrals for chemical and mental health - possibly through [facility] as patient lives in [place].    Patient also feels he has a sleeping problem and is consuming 1/2 bottle of Z Quil a night to help sleep.  Reminded patient that this is a plan for change that we are creating and that it doesn't get fixed over night.  Gave some suggestions:  Healthy meals, exercise (once in the morning and one in the afternoon/evening), socialization, and something of value to do daily.  Reminded patient that when he was working he was expending a lot of energy and that helped him sleep at night, and now he is not working, he still needs to expend that energy so he is tired at night.      Gave patient this SW contact information so he can cll if he parisi any questions or issues.  Patient skiddish about pursuing the [facility] options at this point.  Told patient this SW could assist if that was something he wanted.  [facility] is [facility] with the [facility] but not the [facility].    Placed call to [facility] Kang Hurtado to inquire into their programming - left message and will await a return  call.

## 2022-03-15 ASSESSMENT — ANXIETY QUESTIONNAIRES: GAD7 TOTAL SCORE: 21

## 2022-04-11 NOTE — PROGRESS NOTES
Assessment & Plan     Sleep disorder  Recently sent in paperwork for sleep study due to concerns of sleep apnea. Will await these results. Patient has significantly decreased ETOH intake over the past month. His biggest concern today is his inability to fall asleep. Discussed a trial of trazodone. He will start with 1/2 tablet (25 mg) prior to bed. If tolerating, he may increase this to the full tablet 50 mg. Long, detailed discussion today regarding risks of combining with alcohol. He verbalized understanding and will not take this on Friday/Saturdays when consuming alcohol.   - traZODone (DESYREL) 50 MG tablet  Dispense: 30 tablet; Refill: 0    Alcohol abuse  Worsening ETOH abuse while off of work for the past 2 months. Significant decrease in consumption since previous office visit where he was drinking daily. Reports now drinking Friday and Saturdays only. He is working to cut this back even further.     Generalized anxiety disorder/PTSD (post-traumatic stress disorder)  Effexor trial at last office visit. Took for 1 week and discontinued due to side effects of abdominal pain, headaches. Some improvement in mood with increased activity and decreased ETOH consumption. Discussed trial of sertraline today and patient is interested. Start with 25 mg for 2 weeks and may increase to 50 mg. Encouraged him to call to get scheduled appointment with [facility] as we discussed previously.   - sertraline (ZOLOFT) 25 MG tablet  Dispense: 30 tablet; Refill: 0    Elevated LFTs  History of elevated liver enzymes. Recheck today.   - Hepatic panel (Albumin, ALT, AST, Bili, Alk Phos, TP)      30 minutes spent on the date of the encounter doing chart review, history and exam, documentation and further activities per the note  {    Return in about 1 month (around 5/12/2022) for Follow up, Mood, sleep.    CHANDRA Casillas [facility] - ARPITA Ascencio is a 36 year old who presents for the  following health issues     HPI     Depression and Anxiety Follow-Up    How are you doing with your depression since your last visit? Improved     How are you doing with your anxiety since your last visit?  Improved     Are you having other symptoms that might be associated with depression or anxiety? Yes:  NOT SLEEPING    Have you had a significant life event? Job Concerns     Do you have any concerns with your use of alcohol or other drugs? No    Social History     Tobacco Use     Smoking status: Never Smoker     Smokeless tobacco: Current User     Types: Chew     Tobacco comment: chews daily    Substance Use Topics     Alcohol use: Yes     Comment: 1/2 -1 case of beer weekly     Drug use: No     PHQ 4/29/2021 3/14/2022 4/12/2022   PHQ-9 Total Score 0 18 8   Q9: Thoughts of better off dead/self-harm past 2 weeks Not at all Not at all Not at all     KACEY-7 SCORE 4/29/2021 3/14/2022 4/12/2022   Total Score 0 21 12     Last PHQ-9 4/12/2022   1.  Little interest or pleasure in doing things 0   2.  Feeling down, depressed, or hopeless 1   3.  Trouble falling or staying asleep, or sleeping too much 3   4.  Feeling tired or having little energy 3   5.  Poor appetite or overeating 0   6.  Feeling bad about yourself 0   7.  Trouble concentrating 1   8.  Moving slowly or restless 0   Q9: Thoughts of better off dead/self-harm past 2 weeks 0   PHQ-9 Total Score 8   Difficulty at work, home, or with people Somewhat difficult     KACEY-7  4/12/2022   1. Feeling nervous, anxious, or on edge 0   2. Not being able to stop or control worrying 2   3. Worrying too much about different things 2   4. Trouble relaxing 2   5. Being so restless that it is hard to sit still 2   6. Becoming easily annoyed or irritable 2   7. Feeling afraid, as if something awful might happen 2   KACEY-7 Total Score 12   If you checked any problems, how difficult have they made it for you to do your work, take care of things at home, or get along with other  people? Extremely difficult       Overall improvement in anxiety symptoms over the past month. States he is trying to be more active during the day and has decreased his alcohol intake significantly. States he drinks approximately 12 beers on Fridays and Saturday. He is not consuming any alcohol Sunday-Thursday. He has not returned to work, but is hoping to within the next month. He is calm and talkative today. No pacing noted today (pacing the room at last appointment).     His biggest concern today is his sleep. States it takes him 1.5-3 hours to fall asleep, and then when he does fall asleep he's typically awake within 2 hours. He denies any nightmares.     He sent in the forms for the sleep study and is waiting to get this scheduled.     He heard back from [facility] initially to set up appointment, however states he has not heard back from them again.     Suicide Assessment Five-step Evaluation and Treatment (SAFE-T)          Insomnia  Onset/Duration: cHRONIC  Description:   Frequency of insomnia:  nightly  Time to fall asleep (sleep latency): 1.5-3 hours  Middle of night awakening:  no  Early morning awakening:  YES  Progression of Symptoms:  worsening and constant  Accompanying Signs & Symptoms:  Daytime sleepiness/napping: YES  Excessive snoring/apnea: YES  Restless legs: no  Waking to urinate: no  Chronic pain:  no  Depression symptoms (if yes, do PHQ9): YES  Anxiety symptoms (if yes, do KACEY-7): YES  History:  Prior Insomnia: YES  New stressful situation: YES  Precipitating factors:   Caffeine intake: no  OTC decongestants: no  Any new medications: no  Alleviating factors:  Self medicating (alcohol, etc.):  YES- SOMETIMES  Stress-reduction (exercise, yoga, meditation etc): YES  Therapies tried and outcome: EFFEXOR NOT EFFECTIVE      Review of Systems   Constitutional, HEENT, cardiovascular, pulmonary, GI, , musculoskeletal, neuro, skin, endocrine and psych systems are negative, except as  otherwise noted.      Objective    BP (!) 142/90 (BP Location: Left arm, Patient Position: Sitting, Cuff Size: Adult Large)   Pulse 70   Temp 97.6  F (36.4  C) (Tympanic)   Resp 16   Wt 122.5 kg (270 lb)   SpO2 98%   BMI 43.58 kg/m    Body mass index is 43.58 kg/m .     Physical Exam   GENERAL: healthy, alert and no distress  RESP: lungs clear to auscultation - no rales, rhonchi or wheezes  CV: regular rate and rhythm, normal S1 S2, no S3 or S4, no murmur, click or rub, no peripheral edema and peripheral pulses strong  ABDOMEN: soft, nontender, no hepatosplenomegaly, no masses and bowel sounds normal  MS: no gross musculoskeletal defects noted, no edema  NEURO: Normal strength and tone, mentation intact and speech normal  PSYCH: mentation appears normal, affect normal/bright

## 2022-04-12 ENCOUNTER — OFFICE VISIT (OUTPATIENT)
Dept: FAMILY MEDICINE | Facility: OTHER | Age: 37
End: 2022-04-12
Payer: COMMERCIAL

## 2022-04-12 VITALS
HEART RATE: 70 BPM | DIASTOLIC BLOOD PRESSURE: 90 MMHG | WEIGHT: 270 LBS | TEMPERATURE: 97.6 F | RESPIRATION RATE: 16 BRPM | OXYGEN SATURATION: 98 % | SYSTOLIC BLOOD PRESSURE: 142 MMHG | BODY MASS INDEX: 43.58 KG/M2

## 2022-04-12 DIAGNOSIS — F10.10 ALCOHOL ABUSE: ICD-10-CM

## 2022-04-12 DIAGNOSIS — G47.9 SLEEP DISORDER: Primary | ICD-10-CM

## 2022-04-12 DIAGNOSIS — R79.89 ELEVATED LFTS: ICD-10-CM

## 2022-04-12 DIAGNOSIS — F41.1 GENERALIZED ANXIETY DISORDER: ICD-10-CM

## 2022-04-12 DIAGNOSIS — F43.10 PTSD (POST-TRAUMATIC STRESS DISORDER): ICD-10-CM

## 2022-04-12 LAB
ALBUMIN SERPL-MCNC: 4 G/DL (ref 3.4–5)
ALP SERPL-CCNC: 115 U/L (ref 40–150)
ALT SERPL W P-5'-P-CCNC: 99 U/L (ref 0–70)
AST SERPL W P-5'-P-CCNC: 52 U/L (ref 0–45)
BILIRUB DIRECT SERPL-MCNC: 0.2 MG/DL (ref 0–0.2)
BILIRUB SERPL-MCNC: 0.9 MG/DL (ref 0.2–1.3)
PROT SERPL-MCNC: 7.8 G/DL (ref 6.8–8.8)

## 2022-04-12 PROCEDURE — 36415 COLL VENOUS BLD VENIPUNCTURE: CPT

## 2022-04-12 PROCEDURE — 80076 HEPATIC FUNCTION PANEL: CPT

## 2022-04-12 PROCEDURE — 99214 OFFICE O/P EST MOD 30 MIN: CPT

## 2022-04-12 RX ORDER — SERTRALINE HYDROCHLORIDE 25 MG/1
25 TABLET, FILM COATED ORAL DAILY
Qty: 30 TABLET | Refills: 0 | Status: SHIPPED | OUTPATIENT
Start: 2022-04-12 | End: 2024-01-01

## 2022-04-12 RX ORDER — TRAZODONE HYDROCHLORIDE 50 MG/1
50 TABLET, FILM COATED ORAL AT BEDTIME
Qty: 30 TABLET | Refills: 0 | Status: SHIPPED | OUTPATIENT
Start: 2022-04-12 | End: 2024-01-01

## 2022-04-12 ASSESSMENT — ANXIETY QUESTIONNAIRES
6. BECOMING EASILY ANNOYED OR IRRITABLE: MORE THAN HALF THE DAYS
5. BEING SO RESTLESS THAT IT IS HARD TO SIT STILL: MORE THAN HALF THE DAYS
IF YOU CHECKED OFF ANY PROBLEMS ON THIS QUESTIONNAIRE, HOW DIFFICULT HAVE THESE PROBLEMS MADE IT FOR YOU TO DO YOUR WORK, TAKE CARE OF THINGS AT HOME, OR GET ALONG WITH OTHER PEOPLE: EXTREMELY DIFFICULT
7. FEELING AFRAID AS IF SOMETHING AWFUL MIGHT HAPPEN: MORE THAN HALF THE DAYS
GAD7 TOTAL SCORE: 12
2. NOT BEING ABLE TO STOP OR CONTROL WORRYING: MORE THAN HALF THE DAYS
3. WORRYING TOO MUCH ABOUT DIFFERENT THINGS: MORE THAN HALF THE DAYS
4. TROUBLE RELAXING: MORE THAN HALF THE DAYS
1. FEELING NERVOUS, ANXIOUS, OR ON EDGE: NOT AT ALL

## 2022-04-12 ASSESSMENT — PAIN SCALES - GENERAL: PAINLEVEL: NO PAIN (0)

## 2022-04-12 ASSESSMENT — PATIENT HEALTH QUESTIONNAIRE - PHQ9: SUM OF ALL RESPONSES TO PHQ QUESTIONS 1-9: 8

## 2022-04-12 NOTE — NURSING NOTE
"Chief Complaint   Patient presents with     Depression     Anxiety       Initial There were no vitals taken for this visit. Estimated body mass index is 43.58 kg/m  as calculated from the following:    Height as of 3/14/22: 1.676 m (5' 6\").    Weight as of 3/14/22: 122.5 kg (270 lb).  Medication Reconciliation: complete  Genesis Norris LPN  "

## 2022-04-12 NOTE — PATIENT INSTRUCTIONS
Start sertraline (Zoloft) 25 mg. Take this dose for 2 weeks. If tolerating, may increase to 2 tablets (50 mg).    Start trazodone at bedtime-these are 50 mg tablets (cut tablet in half, 25 mg to start). May increase in 1 week if tolerating.     Do not take the trazodone on the weekend nights you are drinking.

## 2022-04-13 ASSESSMENT — ANXIETY QUESTIONNAIRES: GAD7 TOTAL SCORE: 12

## 2022-04-21 ENCOUNTER — DOCUMENTATION ONLY (OUTPATIENT)
Dept: SLEEP MEDICINE | Facility: HOSPITAL | Age: 37
End: 2022-04-21
Payer: COMMERCIAL
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
## 2022-04-21 NOTE — PROGRESS NOTES
LUIS MIGUEL SYKES       Name: Mario Vance MRN# [id number]   Age: 36 year old YOB: 1985     Stop Bang questionnaire completed with a score of >3 to allow for HST     Have you been told you snore loudly (louder than talking or loud enough to be heard through doors)? YES    Do you often feel tired, fatigued, or sleepy during the daytime? YES    Has anyone observed you stop breathing during your sleep? YES    Do you have or are you being treated for high blood pressure? NO    Is your BMI greater than 35? YES    Is your neck size circumference 16 inches or greater? YES    Are you over 50 years old? NO    Stop Bang Score (# of yes): 6

## 2022-04-22 NOTE — PROGRESS NOTES
"Chart review prior to sleep testing.    Patient Summary:  36 year old yo male who is referred for chronic insomnia.    Patient Active Problem List    Diagnosis Date Noted     Tobacco abuse      Priority: Medium     Morbid obesity (H) 02/21/2019     Priority: Medium       Current Outpatient Medications   Medication     allopurinol (ZYLOPRIM) 100 MG tablet     colchicine (COLCYRS) 0.6 MG tablet     indomethacin (INDOCIN) 50 MG capsule     sertraline (ZOLOFT) 25 MG tablet     traZODone (DESYREL) 50 MG tablet     No current facility-administered medications for this visit.     Pertinent PMHx of morbid obesity, anxiety and / or depression.    STOP-BANG score of 6, with unknown neck circumference.  [place] score of 22-23.  BMI of Estimated body mass index is 43.58 kg/m  as calculated from the following:    Height as of 3/14/22: 1.676 m (5' 6\").    Weight as of 4/12/22: 122.5 kg (270 lb).     Per questionnaire: \"I don't sleep\"    Sxs for years, recommended for sleep testing.    Caffeine use:  No for 3+ per day.  No for within 6 hours of bed.    Tobacco use: Yes    Sleep Habits:   Do you read in bed? No  Do you eat in bed? No  Do you watch TV in bed? Yes  Do you work in bed? No  Do you use a phone or computer in bed? Yes    Sleep pattern:  Workdays.  8pm - 5am, total sleep time 2 hours.  Weekends.  8pm - 5am, total sleep time 2 hours.  Time to fall asleep: \"a few hours\".  Awakenings: 2-6 times per night, 60+ minutes to return to sleep and awake for 6 hours total.  Napping.  7 days per week, \"minutes to hours\" per nap.    Yes for RLS screen.  No for sleep walking.  No for dream enactment behavior.  Yes for bruxism.    Yes for morning headaches.  Yes for snoring.  Yes for observed apnea.  Unknown for FHx of HEMANT.    SHx:  Single, lives alone.  Not currently working.    A/P:  1.)  High likelihood of HEMANT with STOP-BANG score of 6.   - Would appear to be candidate for either home sleep testing or in-lab PSG.   - Given quite " elevated epworth, would prefer in-lab PSG and can offer zolpidem 5-10mg for night of sleep study.    2.)  Chronic sleep onset and maintenance insomnia  - Potential RLS  - Unclear sleep wake pattern with unknown amount of daytime napping, inadequate sleep hygiene, sleep expansion, can't rule out sleep-state misperception.  - Nicotine use  - Will get further details on sleep pattern at visit, but would start with evaluation for sleep-disordered breathing along with likely behavioral modification.    ---  This note was written with the assistance of the Dragon voice-dictation technology software. The final document, although reviewed, may contain errors. For corrections, please contact the office.    Hugo Veras MD    Sleep Medicine  [facility]  ([phone])  [facility]  ([phone])

## 2022-05-06 DIAGNOSIS — G47.33 OBSTRUCTIVE SLEEP APNEA (ADULT) (PEDIATRIC): Primary | ICD-10-CM

## 2022-05-10 ENCOUNTER — OFFICE VISIT (OUTPATIENT)
Dept: SLEEP MEDICINE | Facility: HOSPITAL | Age: 37
End: 2022-05-10
Attending: FAMILY MEDICINE
Payer: COMMERCIAL

## 2022-05-10 ENCOUNTER — APPOINTMENT (OUTPATIENT)
Dept: OCCUPATIONAL MEDICINE | Facility: OTHER | Age: 37
End: 2022-05-10

## 2022-05-10 ENCOUNTER — APPOINTMENT (OUTPATIENT)
Dept: CHIROPRACTIC MEDICINE | Facility: OTHER | Age: 37
End: 2022-05-10

## 2022-05-10 DIAGNOSIS — G47.33 OBSTRUCTIVE SLEEP APNEA (ADULT) (PEDIATRIC): ICD-10-CM

## 2022-05-10 PROCEDURE — 99000 SPECIMEN HANDLING OFFICE-LAB: CPT

## 2022-05-10 PROCEDURE — 99499 UNLISTED E&M SERVICE: CPT

## 2022-05-13 NOTE — PROGRESS NOTES
Watch PAT device has been registered and shipped via [employer] on 5/10/2022. Patient was notified that package was mailed out. [facility] serial number: [id number].

## 2022-06-13 NOTE — TELEPHONE ENCOUNTER
9:39 AM    Reason for Call: OVERBOOK    Patient is having the following symptoms: Gout flare up for 1 weeks.    The patient is requesting an appointment for ASAP with Dr. Carvalho.    Was an appointment offered for this call? No  If yes : Appointment type              Date    Preferred method for responding to this message: Telephone Call  What is your phone number ?[phone]     If we cannot reach you directly, may we leave a detailed response at the number you provided? No    Can this message wait until your PCP/provider returns, if unavailable today? YES    Maria Del Carmen Ireland     ID band present and verified. Family is not present.

## 2022-07-12 DIAGNOSIS — M10.472 ACUTE GOUT DUE TO OTHER SECONDARY CAUSE INVOLVING TOE OF LEFT FOOT: ICD-10-CM

## 2022-07-12 RX ORDER — ALLOPURINOL 100 MG/1
100 TABLET ORAL AT BEDTIME
Qty: 30 TABLET | Refills: 3 | Status: SHIPPED | OUTPATIENT
Start: 2022-07-12 | End: 2022-09-09

## 2022-07-12 RX ORDER — INDOMETHACIN 50 MG/1
50 CAPSULE ORAL
Qty: 45 CAPSULE | Refills: 3 | Status: SHIPPED | OUTPATIENT
Start: 2022-07-12 | End: 2022-09-09

## 2022-07-12 RX ORDER — COLCHICINE 0.6 MG/1
TABLET ORAL
Qty: 30 TABLET | Refills: 3 | Status: SHIPPED | OUTPATIENT
Start: 2022-07-12 | End: 2022-09-09

## 2022-07-12 NOTE — TELEPHONE ENCOUNTER
colchicine (COLCYRS) 0.6 MG tablet        Last Written Prescription Date:  4/29/21  Last Fill Quantity: 30,   # refills: 3  Last Office Visit: 4/12/22  Future Office visit:       Routing refill request to provider for review/approval because:    CBC on file in past 12 months    CBC RESULTS: Recent Labs   Lab Test 04/29/21  1119   WBC 6.7   RBC 5.72   HGB 16.5   HCT 47.4   MCV 83   MCH 28.8   MCHC 34.8   RDW 14.7          Has Uric Acid on file in past 12 months and value is less than 6    Uric Acid   Date Value Ref Range Status   04/29/2021 7.8 (H) 3.5 - 7.2 mg/dL Final           indomethacin (INDOCIN) 50 MG capsule        Last Written Prescription Date:  4/29/21  Last Fill Quantity: 45,   # refills: 3  Last Office Visit: 4/12/22  Future Office visit:       Routing refill request to provider for review/approval because:    Same as listed above (uric acid and cbc)    allopurinol (ZYLOPRIM) 100 MG tablet        Last Written Prescription Date:  4/29/21  Last Fill Quantity: 30,   # refills: 3  Last Office Visit: 4/12/22  Future Office visit:       Routing refill request to provider for review/approval because:    Same as listed above (uric acid and cbc)

## 2022-07-14 ENCOUNTER — TELEPHONE (OUTPATIENT)
Dept: SLEEP MEDICINE | Facility: HOSPITAL | Age: 37
End: 2022-07-14

## 2022-07-15 NOTE — TELEPHONE ENCOUNTER
Attempt # 1  Outcome: No Answer/No Voicemail/Busy   Comment: attempted to reach pt but no answer and no voicemail; will send letter

## 2022-08-25 ENCOUNTER — TELEPHONE (OUTPATIENT)
Dept: SLEEP MEDICINE | Facility: HOSPITAL | Age: 37
End: 2022-08-25

## 2022-09-09 ENCOUNTER — TELEPHONE (OUTPATIENT)
Dept: FAMILY MEDICINE | Facility: OTHER | Age: 37
End: 2022-09-09

## 2022-09-09 DIAGNOSIS — M10.472 ACUTE GOUT DUE TO OTHER SECONDARY CAUSE INVOLVING TOE OF LEFT FOOT: ICD-10-CM

## 2022-09-09 RX ORDER — COLCHICINE 0.6 MG/1
TABLET ORAL
Qty: 30 TABLET | Refills: 0 | Status: SHIPPED | OUTPATIENT
Start: 2022-09-09 | End: 2023-07-20

## 2022-09-09 RX ORDER — ALLOPURINOL 100 MG/1
100 TABLET ORAL AT BEDTIME
Qty: 30 TABLET | Refills: 3 | Status: SHIPPED | OUTPATIENT
Start: 2022-09-09 | End: 2023-07-20

## 2022-09-09 RX ORDER — INDOMETHACIN 50 MG/1
50 CAPSULE ORAL
Qty: 45 CAPSULE | Refills: 0 | Status: SHIPPED | OUTPATIENT
Start: 2022-09-09 | End: 2023-07-20

## 2022-09-09 NOTE — TELEPHONE ENCOUNTER
Pt calling back and his other two gout medications where not filled.He only got the Allopurinol.    He is out of town working and needs to go to the [facility] there. He will be there until after [place].    Pended.    Bettie Esteban RN

## 2022-09-09 NOTE — TELEPHONE ENCOUNTER
Allopurinol 100 mg      Last Written Prescription Date:  7/12/22  Last Fill Quantity: 30,   # refills: 3      Colchicine 0.6 mg      Last Written Prescription Date:  7/12/22  Last Fill Quantity: 30,   # refills: 3      indomethaxin 50 mg      Last Written Prescription Date:  7/12/22  Last Fill Quantity: 45,   # refills: 3      Last Office Visit: 4/12/22  Future Office visit:       Routing refill request to provider for review/approval because:  Drug not on the [facility] or [facility] refill protocol or controlled substance      Pt request refill be directed to [facility], [place] d/t 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

## 2022-09-14 NOTE — TELEPHONE ENCOUNTER
Attempt # 1  Outcome: Talked with Patient    Comment: called and pt asked to be called in a few months to schedule

## 2022-10-27 NOTE — NURSING NOTE
"Chief Complaint   Patient presents with     Physical       Initial /84   Pulse 79   Temp 98  F (36.7  C) (Tympanic)   Resp 16   Ht 1.676 m (5' 6\")   Wt 120.2 kg (265 lb)   SpO2 98%   BMI 42.77 kg/m   Estimated body mass index is 42.77 kg/m  as calculated from the following:    Height as of this encounter: 1.676 m (5' 6\").    Weight as of this encounter: 120.2 kg (265 lb).  Medication Reconciliation: complete    Padmini Brumfield, MEGHANA  " [Walk Is Unsteady] : walk is unsteady [Slowed] : slowed [Cooperative] : cooperative [Depressed] : depressed [Clear] : clear [Linear/Goal Directed] : linear/goal directed [None] : none [None Reported] : none reported [Attention/Concentration] : attention/concentration [Average] : average [WNL] : within normal limits [Full] : full [FreeTextEntry5] : appears uncomfortable stretching in chair, and massaging her neck and back.  [FreeTextEntry1] : Wearing sweats and T shirt, hair  pulled back in a pony tail and no make up done.  [FreeTextEntry7] : feels subjectively forgetful, and will at times lose train of thought.

## 2023-01-12 ENCOUNTER — TELEPHONE (OUTPATIENT)
Dept: FAMILY MEDICINE | Facility: OTHER | Age: 38
End: 2023-01-12

## 2023-01-12 NOTE — TELEPHONE ENCOUNTER
3:42 PM    Reason for Call: Phone Call    Description: Patient called in stating that he needs [facility] by [facility]19-23. [facility] phone number: Shaneka Cunningham [phone] Please call patient back.    Was an appointment offered for this call? No  If yes : Appointment type              Date    Preferred method for responding to this message: Telephone Call  What is your phone number ? [phone]    If we cannot reach you directly, may we leave a detailed response at the number you provided? Yes    Can this message wait until your PCP/provider returns, if available today? Not applicable, provider in clinic today.    Aleisha Mathis

## 2023-01-13 ENCOUNTER — TELEPHONE (OUTPATIENT)
Dept: PULMONOLOGY | Facility: OTHER | Age: 38
End: 2023-01-13

## 2023-01-13 NOTE — TELEPHONE ENCOUNTER
Please call patient in regards to [facility]; see note below. Thank you.  Genesis Norris LPN

## 2023-01-16 ENCOUNTER — DOCUMENTATION ONLY (OUTPATIENT)
Dept: SLEEP MEDICINE | Facility: HOSPITAL | Age: 38
End: 2023-01-16
Attending: FAMILY MEDICINE
Payer: COMMERCIAL

## 2023-01-16 DIAGNOSIS — G47.33 OSA (OBSTRUCTIVE SLEEP APNEA): Primary | ICD-10-CM

## 2023-01-16 DIAGNOSIS — G47.33 OSA (OBSTRUCTIVE SLEEP APNEA): ICD-10-CM

## 2023-01-16 PROCEDURE — 95800 SLP STDY UNATTENDED: CPT

## 2023-01-16 PROCEDURE — 95800 SLP STDY UNATTENDED: CPT | Mod: 26

## 2023-01-16 NOTE — PROGRESS NOTES
WatchPAT data has been received and has been scored using rule 1B, 4%. Patient to follow up with provider to determine appropriate therapy.    Pat AHI: 12.8    Ordering Provider: Dr Hugo Veras      Sleep Technician/Technologist: Susanne SILVA

## 2023-01-16 NOTE — PROCEDURES
"WatchPAT - HOME SLEEP STUDY INTERPRETATION    Patient: Mario Vance  MRN: [id number]  YOB: 1985  Study Date: 2023  Referring Provider: Elisabeth Carvalho  Ordering Provider: Hugo Veras MD, MD    Chain of custody patient verification was not enabled.       Indications for Home Study: Mario Vance is a 37 year old male with a history of morbid obesity, anxiety and / or depression who presents with symptoms suggestive of obstructive sleep apnea.    Estimated body mass index is 43.58 kg/m  as calculated from the following:    Height as of 3/14/22: 1.676 m (5' 6\").    Weight as of 22: 122.5 kg (270 lb).  [place] Sleepiness Scale:   STOP-BAN/8    Data: A full night home sleep study was performed recording the standard physiologic parameters including peripheral arterial tonometry (PAT), sound/snoring, body position,  movement, sound, and oxygen saturation by pulse oximetry. Pulse rate was estimated by oximetry recording. Sleep staging (wake, REM, light, and deep sleep) was derived from PAT signal.  This study was considered adequate based on > 4 hours of quality oximetry and respiratory recording. As specified by the AASM Manual for the Scoring of Sleep and Associated events, version 2.3, Rule VIII.D 1B, 4% oxygen desaturation scoring for hypopneas is used as a standard of care on all home sleep apnea testing.    Total Recording Time: 7 hrs, 24 min  Total Sleep Time: 4 hrs, 35 min  % of Sleep Time REM: 19.6%    Respiratory:  Snoring: Snoring was present.  Respiratory events: The PAT respiratory disturbance index [pRDI] was 21.5 events per hour.  The PAT apnea/hypopnea index [pAHI] was 12.8 events per hour.  IZZY was 7.6 events per hour.  During REM sleep the pAHI was 44.9.  Sleep Associated Hypoxemia: sustained hypoxemia was not present. Mean oxygen saturation was 93%.  Minimum was 86%.  Time with saturation less than 88% was 0.2 minutes.    Heart Rate: By pulse oximetry " normal rate was noted.     Position: Percent of time spent: supine - 21.6%, prone - 0%, on right - 46.8%, on left - 31.6%.  pAHI was 30.3 per hour supine, - per hour prone, 5.4 per hour on right side, and 6.5 per hour on left side.     Assessment:   - Mild obstructive sleep apnea.  - Sleep associated hypoxemia was not present.  - Strong positional component (supine pAHI 30.3, non-supine pAHI 5.8 with supine sleep 21.6% of recording)    Recommendations:  Consider auto-CPAP at 5-15 cmH2O, oral appliance therapy, positional therapy or polysomnography with full night PAP titration.  Suggest optimizing sleep hygiene and avoiding sleep deprivation.  Weight management.    Diagnosis Code(s): Obstructive Sleep Apnea G47.33    Hugo Veras MD, MD, January 16, 2023   Diplomate, American Board of Family Medicine, Sleep Medicine

## 2023-01-27 NOTE — PROGRESS NOTES
Mario Vance is a 37 year old male who is being evaluated via a billable telephone visit.       What phone number would you like to be contacted at?@ [phone]  Home Phone [phone]   Work Phone Not on file.   Mobile [phone]       How would you like to obtain your AVS? [employer]       Telephone Virtual Visit Details     Type of service:  Telephone Virtual Visit     Originating Location (pt. Location): Home     Distant Location (provider location):  Off-site, [facility] [place]      Start Time: 4pm  End Time: 4:30pm    Virtual visit for review of home sleep testing results and chronic insomnia.     A/P:  1.)  Mild HEMANT (pAHI 12.8) without sleep-associated hypoxemia  -We discussed the pathophysiology of obstructive sleep apnea, that this level of obstructive sleep apnea is typically not felt to have a significant increase of long-term cardiovascular risk factors for treatment may have benefit for sleep quality, sleep maintenance insomnia, daytime fatigue and cognition.  - He is motivated to try CPAP given multiple friends who have done very well with it.  - Orders placed for CPAP auto titrate 5-15 cm H2O.     2.)  Chronic sleep onset and maintenance insomnia  - Potential RLS  - Unclear sleep wake pattern with unknown amount of daytime napping, inadequate sleep hygiene, sleep expansion, can't rule out sleep-state misperception.  - Nicotine use  -Excessive alcohol use, discussed how this can negatively affect sleep and health.  -He like to start with treatment of obstructive sleep apnea, this seems reasonable.  But we did discuss that we likely will recommend maintaining sleep diaries, consider actigraphy.    3.) Severe / Morbid Obesity:   - Counseled on the importance of strict adherence to diet, an exercise routine, and reducing weight.  Weight loss would improve sleep-disordered breathing.  Estimated body mass index is 40.35 kg/m  as calculated from the following:    Height as of this  "encounter: 1.676 m (5' 6\").    Weight as of this encounter: 113.4 kg (250 lb).      SUBJECTIVE:  Mario Vance is a 37 year old male.    Pertinent PMHx of morbid obesity, anxiety and / or depression, PTSD, EtOH use.     STOP-BANG score of 6, with unknown neck circumference.  [place] score of 22-23.  BMI of Estimated body mass index is 43.58 kg/m  as calculated from the following:    Height as of 3/14/22: 1.676 m (5' 6\").    Weight as of 4/12/22: 122.5 kg (270 lb).      Today -reviewed his home sleep test results and sleep history in more detail.    His sleep-wake pattern seems to be quite variable and it is challenging to get an idea of averages or ranges of timing.  Part of the challenges that he works as a  and often will travel and live onsite for potentially weeks or months at a time.  During these times he will often work 6 days/week, 12-hour shifts.  He relates he returned or finished a job where he was onsite for 5 months living in a hotel.    Currently, he will seem to get to bed between 9-10 PM, will then watch the clock, lay in the dark and denies using TV or phone in bed.  The actual time of falling asleep is unclear.  He will have potentially up to 3 awakenings per night, to use the bathroom, feeling of his arms or hands being numb, he will then be up for \"a while\" and often will watch TV during this time.  On workdays will wake up between 430-6:30 AM.  He will often nap over his lunch break.  On weekends, he may sleep until noon or 1 PM.    Caffeine use: He has 1 energy drink in the morning  Nicotine use: He will use to her stuff, will typically use 1 can in 2-3 days  Alcohol use: May be as high as 6-7 beers per night, 7 days/week.  He states his recently is less than usual due to being winter and not working and also increased weight gain.    Per questionnaire: \"I don't sleep\"     Sxs for years, recommended for sleep testing.     Caffeine use:  No for 3+ per day.  No for within " "6 hours of bed.     Tobacco use: Yes     Sleep Habits:   Do you read in bed? No  Do you eat in bed? No  Do you watch TV in bed? Yes  Do you work in bed? No  Do you use a phone or computer in bed? Yes     Sleep pattern:  Workdays.  8pm - 5am, total sleep time 2 hours.  Weekends.  8pm - 5am, total sleep time 2 hours.  Time to fall asleep: \"a few hours\".  Awakenings: 2-6 times per night, 60+ minutes to return to sleep and awake for 6 hours total.  What do you do when you wake up? Get up and drink water watch tv  Napping.  7 days per week, \"minutes to hours\" per nap.     Sleep Habits:   Do you read in bed? No  Do you eat in bed? No  Do you watch TV in bed? Yes  Do you work in bed? No  Do you use a phone or computer in bed? Yes       Yes for RLS screen.  No for sleep walking.  No for dream enactment behavior.  Yes for bruxism.     Yes for morning headaches.  Yes for snoring.  Yes for observed apnea.  Unknown for FHx of HEMANT.     SHx:  Single, lives alone.  Not currently working.    WatchPAT - HOME SLEEP STUDY INTERPRETATION     Patient: Mario Vance  MRN: [id number]  YOB: 1985  Study Date: 2023  Referring Provider: Elisabeth Carvalho  Ordering Provider: Hugo Veras MD, MD     Chain of custody patient verification was not enabled.       Indications for Home Study: Mario Vance is a 37 year old male with a history of morbid obesity, anxiety and / or depression who presents with symptoms suggestive of obstructive sleep apnea.     Estimated body mass index is 43.58 kg/m  as calculated from the following:    Height as of 3/14/22: 1.676 m (5' 6\").    Weight as of 22: 122.5 kg (270 lb).  [place] Sleepiness Scale: 22-  STOP-BAN/8     Data: A full night home sleep study was performed recording the standard physiologic parameters including peripheral arterial tonometry (PAT), sound/snoring, body position,  movement, sound, and oxygen saturation by pulse oximetry. Pulse rate was " estimated by oximetry recording. Sleep staging (wake, REM, light, and deep sleep) was derived from PAT signal.  This study was considered adequate based on > 4 hours of quality oximetry and respiratory recording. As specified by the AASM Manual for the Scoring of Sleep and Associated events, version 2.3, Rule VIII.D 1B, 4% oxygen desaturation scoring for hypopneas is used as a standard of care on all home sleep apnea testing.     Total Recording Time: 7 hrs, 24 min  Total Sleep Time: 4 hrs, 35 min  % of Sleep Time REM: 19.6%     Respiratory:  Snoring: Snoring was present.  Respiratory events: The PAT respiratory disturbance index [pRDI] was 21.5 events per hour.  The PAT apnea/hypopnea index [pAHI] was 12.8 events per hour.  IZZY was 7.6 events per hour.  During REM sleep the pAHI was 44.9.  Sleep Associated Hypoxemia: sustained hypoxemia was not present. Mean oxygen saturation was 93%.  Minimum was 86%.  Time with saturation less than 88% was 0.2 minutes.     Heart Rate: By pulse oximetry normal rate was noted.      Position: Percent of time spent: supine - 21.6%, prone - 0%, on right - 46.8%, on left - 31.6%.  pAHI was 30.3 per hour supine, - per hour prone, 5.4 per hour on right side, and 6.5 per hour on left side.      Assessment:   - Mild obstructive sleep apnea.  - Sleep associated hypoxemia was not present.  - Strong positional component (supine pAHI 30.3, non-supine pAHI 5.8 with supine sleep 21.6% of recording)     Recommendations:  Consider auto-CPAP at 5-15 cmH2O, oral appliance therapy, positional therapy or polysomnography with full night PAP titration.  Suggest optimizing sleep hygiene and avoiding sleep deprivation.  Weight management.     Diagnosis Code(s): Obstructive Sleep Apnea G47.33     Hugo Veras MD, MD, January 16, 2023   Diplomate, American Board of Family Medicine, Sleep Medicine        Past medical history:    Patient Active Problem List    Diagnosis Date Noted     Tobacco abuse       Priority: Medium     Morbid obesity (H) 02/21/2019     Priority: Medium       10 point ROS of systems including Constitutional, Eyes, Respiratory, Cardiovascular, Gastroenterology, Genitourinary, Integumentary, Muscularskeletal, Psychiatric were all negative except for pertinent positives noted in my HPI.    Current Outpatient Medications   Medication Sig Dispense Refill     allopurinol (ZYLOPRIM) 100 MG tablet Take 1 tablet (100 mg) by mouth At Bedtime 30 tablet 3     colchicine (COLCYRS) 0.6 MG tablet Take one pill every 2 hours to max of 6 pills a day  Or diarrhea develo 30 tablet 0     indomethacin (INDOCIN) 50 MG capsule Take 1 capsule (50 mg) by mouth 3 times daily (with meals) 45 capsule 0     sertraline (ZOLOFT) 25 MG tablet Take 1 tablet (25 mg) by mouth daily 30 tablet 0     traZODone (DESYREL) 50 MG tablet Take 1 tablet (50 mg) by mouth At Bedtime 30 tablet 0       OBJECTIVE:  There were no vitals taken for this visit.    Physical Exam:  healthy, alert and no distress  PSYCH: Alert and oriented times 3; coherent speech, normal   rate and volume, able to articulate logical thoughts, able   to abstract reason, no tangential thoughts, no hallucinations   or delusions  His affect is normal  RESP: No cough, no audible wheezing, able to talk in full sentences  Remainder of exam unable to be completed due to telephone visits    ---  This note was written with the assistance of the Dragon voice-dictation technology software. The final document, although reviewed, may contain errors. For corrections, please contact the office.    Total time spent preparing to see the patient, review of chart, obtaining history and physical examination, review of sleep testing, review of treatment options, education, discussion with patient and documenting in Epic / EMR was 35 minutes.  All time involved was spent on the day of service for the patient (the same day as the patient's appointment).    Hugo Veras MD    Sleep  [facility]  - [place], [place]  o Main Office: [phone]    [employer] [facility] [place]  o [address] [place] [address], [place], [address]  o Schedule visits: [phone]  o Main Office: [phone]  o Fax: [phone]

## 2023-01-30 ENCOUNTER — VIRTUAL VISIT (OUTPATIENT)
Dept: PULMONOLOGY | Facility: OTHER | Age: 38
End: 2023-01-30
Attending: FAMILY MEDICINE
Payer: COMMERCIAL

## 2023-01-30 VITALS — WEIGHT: 250 LBS | BODY MASS INDEX: 40.18 KG/M2 | HEIGHT: 66 IN

## 2023-01-30 DIAGNOSIS — G47.33 OSA (OBSTRUCTIVE SLEEP APNEA): Primary | ICD-10-CM

## 2023-01-30 DIAGNOSIS — G47.10 HYPERSOMNIA: ICD-10-CM

## 2023-01-30 PROCEDURE — 99203 OFFICE O/P NEW LOW 30 MIN: CPT | Mod: 95 | Performed by: FAMILY MEDICINE

## 2023-01-30 ASSESSMENT — SLEEP AND FATIGUE QUESTIONNAIRES
HOW LIKELY ARE YOU TO NOD OFF OR FALL ASLEEP WHILE WATCHING TV: HIGH CHANCE OF DOZING
HOW LIKELY ARE YOU TO NOD OFF OR FALL ASLEEP WHILE SITTING AND READING: HIGH CHANCE OF DOZING
HOW LIKELY ARE YOU TO NOD OFF OR FALL ASLEEP WHILE LYING DOWN TO REST IN THE AFTERNOON WHEN CIRCUMSTANCES PERMIT: HIGH CHANCE OF DOZING
HOW LIKELY ARE YOU TO NOD OFF OR FALL ASLEEP IN A CAR, WHILE STOPPED FOR A FEW MINUTES IN TRAFFIC: HIGH CHANCE OF DOZING
HOW LIKELY ARE YOU TO NOD OFF OR FALL ASLEEP WHILE SITTING AND TALKING TO SOMEONE: HIGH CHANCE OF DOZING
HOW LIKELY ARE YOU TO NOD OFF OR FALL ASLEEP WHEN YOU ARE A PASSENGER IN A CAR FOR AN HOUR WITHOUT A BREAK: HIGH CHANCE OF DOZING
HOW LIKELY ARE YOU TO NOD OFF OR FALL ASLEEP WHILE SITTING INACTIVE IN A PUBLIC PLACE: HIGH CHANCE OF DOZING
HOW LIKELY ARE YOU TO NOD OFF OR FALL ASLEEP WHILE SITTING QUIETLY AFTER LUNCH WITHOUT ALCOHOL: HIGH CHANCE OF DOZING

## 2023-01-30 ASSESSMENT — PAIN SCALES - GENERAL: PAINLEVEL: NO PAIN (0)

## 2023-01-30 NOTE — PROGRESS NOTES
Sonu is a 37 year old who is being evaluated via a billable telephone visit.      What phone number would you like to be contacted at? ***[phone]  How would you like to obtain your AVS? Mail a copy  {PROVIDER LOCATION On-site should be selected for visits conducted from your clinic location or adjoining [facility] hospital, academic office, or other nearby [facility] building. Off-site should be selected for all other provider locations, including home:[id number]}  Distant Location (provider location):  {virtual location provider:[id number]}    Phone call duration: *** minutes      Farida Blake

## 2023-04-14 ENCOUNTER — MEDICAL CORRESPONDENCE (OUTPATIENT)
Dept: GENERAL RADIOLOGY | Facility: OTHER | Age: 38
End: 2023-04-14

## 2023-04-26 ENCOUNTER — ANCILLARY PROCEDURE (OUTPATIENT)
Dept: GENERAL RADIOLOGY | Facility: OTHER | Age: 38
End: 2023-04-26
Attending: PHYSICAL MEDICINE & REHABILITATION
Payer: OTHER GOVERNMENT

## 2023-04-26 DIAGNOSIS — M19.90 ARTHRITIS: ICD-10-CM

## 2023-04-26 PROCEDURE — 73010 X-RAY EXAM OF SHOULDER BLADE: CPT | Mod: TC | Performed by: RADIOLOGY

## 2023-04-26 PROCEDURE — 73000 X-RAY EXAM OF COLLAR BONE: CPT | Mod: TC | Performed by: RADIOLOGY

## 2023-07-19 DIAGNOSIS — M10.472 ACUTE GOUT DUE TO OTHER SECONDARY CAUSE INVOLVING TOE OF LEFT FOOT: ICD-10-CM

## 2023-07-19 NOTE — TELEPHONE ENCOUNTER
Patient calls asking for refills to be sent to local [facility] pharmacy in [place].  States he will be working there for the next two years    No contact info for this [facility] in [facility].  Will send to local [facility] after PCP signs  Instructed Pt to stop at [place] [facility] & request a pharm to pharmacy transfer.    Patient relayed understanding.  No further concerns at calls end.

## 2023-07-20 RX ORDER — COLCHICINE 0.6 MG/1
TABLET ORAL
Qty: 30 TABLET | Refills: 1 | Status: SHIPPED | OUTPATIENT
Start: 2023-07-20 | End: 2023-07-20

## 2023-07-20 RX ORDER — COLCHICINE 0.6 MG/1
TABLET ORAL
Qty: 30 TABLET | Refills: 1 | Status: SHIPPED | OUTPATIENT
Start: 2023-07-20

## 2023-07-20 RX ORDER — ALLOPURINOL 100 MG/1
100 TABLET ORAL AT BEDTIME
Qty: 30 TABLET | Refills: 1 | Status: SHIPPED | OUTPATIENT
Start: 2023-07-20 | End: 2023-07-20

## 2023-07-20 RX ORDER — INDOMETHACIN 50 MG/1
50 CAPSULE ORAL
Qty: 45 CAPSULE | Refills: 1 | Status: SHIPPED | OUTPATIENT
Start: 2023-07-20 | End: 2024-01-19

## 2023-07-20 RX ORDER — ALLOPURINOL 100 MG/1
100 TABLET ORAL AT BEDTIME
Qty: 30 TABLET | Refills: 1 | Status: SHIPPED | OUTPATIENT
Start: 2023-07-20 | End: 2024-01-19

## 2023-07-20 RX ORDER — INDOMETHACIN 50 MG/1
50 CAPSULE ORAL
Qty: 45 CAPSULE | Refills: 1 | Status: SHIPPED | OUTPATIENT
Start: 2023-07-20 | End: 2023-07-20

## 2023-07-20 NOTE — TELEPHONE ENCOUNTER
Please call, pt needs labs for ongoing fills. I will fill for a month, but needs to establish at a clinic there to get bloodwork and refill these medications.

## 2023-07-20 NOTE — TELEPHONE ENCOUNTER
Updated patient with PCP's recommendations below  Patient relayed understanding.  Pt states the [place] [facility] does not have a pharmacy  States there is a [facility]  Writer found TW under E-prescribing  Updated refills & redirected to TW

## 2024-01-01 ENCOUNTER — HOSPITAL ENCOUNTER (EMERGENCY)
Facility: HOSPITAL | Age: 39
Discharge: HOME | End: 2024-01-01
Attending: STUDENT IN AN ORGANIZED HEALTH CARE EDUCATION/TRAINING PROGRAM | Admitting: STUDENT IN AN ORGANIZED HEALTH CARE EDUCATION/TRAINING PROGRAM
Payer: COMMERCIAL

## 2024-01-01 ENCOUNTER — APPOINTMENT (OUTPATIENT)
Dept: GENERAL RADIOLOGY | Facility: HOSPITAL | Age: 39
End: 2024-01-01
Attending: STUDENT IN AN ORGANIZED HEALTH CARE EDUCATION/TRAINING PROGRAM
Payer: COMMERCIAL

## 2024-01-01 VITALS
TEMPERATURE: 101.1 F | DIASTOLIC BLOOD PRESSURE: 103 MMHG | RESPIRATION RATE: 26 BRPM | HEART RATE: 123 BPM | OXYGEN SATURATION: 94 % | SYSTOLIC BLOOD PRESSURE: 160 MMHG

## 2024-01-01 DIAGNOSIS — R05.1 ACUTE COUGH: ICD-10-CM

## 2024-01-01 DIAGNOSIS — J10.1 INFLUENZA A: ICD-10-CM

## 2024-01-01 LAB
FLUAV RNA SPEC QL NAA+PROBE: POSITIVE
FLUBV RNA RESP QL NAA+PROBE: NEGATIVE
RSV RNA SPEC NAA+PROBE: NEGATIVE
SARS-COV-2 RNA RESP QL NAA+PROBE: NEGATIVE

## 2024-01-01 PROCEDURE — 99284 EMERGENCY DEPT VISIT MOD MDM: CPT | Performed by: STUDENT IN AN ORGANIZED HEALTH CARE EDUCATION/TRAINING PROGRAM

## 2024-01-01 PROCEDURE — 71046 X-RAY EXAM CHEST 2 VIEWS: CPT

## 2024-01-01 PROCEDURE — 99284 EMERGENCY DEPT VISIT MOD MDM: CPT | Mod: 25

## 2024-01-01 PROCEDURE — 250N000013 HC RX MED GY IP 250 OP 250 PS 637: Performed by: STUDENT IN AN ORGANIZED HEALTH CARE EDUCATION/TRAINING PROGRAM

## 2024-01-01 PROCEDURE — 87637 SARSCOV2&INF A&B&RSV AMP PRB: CPT | Performed by: STUDENT IN AN ORGANIZED HEALTH CARE EDUCATION/TRAINING PROGRAM

## 2024-01-01 RX ORDER — ACETAMINOPHEN 325 MG/1
975 TABLET ORAL ONCE
Status: COMPLETED | OUTPATIENT
Start: 2024-01-01 | End: 2024-01-01

## 2024-01-01 RX ADMIN — ACETAMINOPHEN 975 MG: 325 TABLET, FILM COATED ORAL at 07:48

## 2024-01-01 NOTE — DISCHARGE INSTRUCTIONS
You were evaluated today for multiple symptom. Our evaluation ultimately inticated that you are suffering from the FLU. Please continue taking supportive measures as you have. If your symptoms worsen significantly, please return. I suggest you buy a pulse oximeter to ensure your oxygen saturations are at normal levels. You are at higher risk of a superimposed pneumonia. Please watch out for worsening symptoms.  Otherwise I expect you to make a full and uneventful recovery in the course of the coming week. Thank you for coming to [facility].

## 2024-01-01 NOTE — ED TRIAGE NOTES
Pt states that for the past 2-3 days he has been short of breath, had a headache, cough, fever & body aches.

## 2024-01-01 NOTE — ED PROVIDER NOTES
History     Chief Complaint   Patient presents with    Cough    Shortness of Breath    Headache    Sinusitis    Generalized Body Aches     HPI  Mario Vance is a 38 year old male who presents with concern of multiple complaints including generalized bodyaches, fever, shortness of breath, headache.  Patient states that for the last 2 or 3 days he has been having those symptoms.  Patient states that his brother, sister-in-law, and 2 nephews are also sick with similar symptoms.  He celebrated the holidays with them 5 or 6 days ago.  Patient denies any nausea or vomiting.  No chest pain or belly pain.  No changes to bowel or bladder.    Allergies:  No Known Allergies    Problem List:    Patient Active Problem List    Diagnosis Date Noted    Tobacco abuse      Priority: Medium    Morbid obesity (H) 02/21/2019     Priority: Medium        Past Medical History:    Past Medical History:   Diagnosis Date    Tobacco abuse        Past Surgical History:    Past Surgical History:   Procedure Laterality Date    AMPUTATE FINGER(S) Right 4/6/2015    Procedure: AMPUTATE FINGER(S);  Surgeon: Shakir Mcmanus MD;  Location: [facility] OR       Family History:    Family History   Problem Relation Age of Onset    Kidney Cancer Father     Peripheral Vascular Disease Father        Social History:  Marital Status:  Single [1]  Social History     Tobacco Use    Smoking status: Never    Smokeless tobacco: Current     Types: Chew    Tobacco comments:     chews daily    Substance Use Topics    Alcohol use: Yes     Comment: 1/2 -1 case of beer weekly    Drug use: No        Medications:    acetaminophen (TYLENOL) 500 MG tablet  allopurinol (ZYLOPRIM) 100 MG tablet  colchicine (COLCRYS) 0.6 MG tablet  indomethacin (INDOCIN) 50 MG capsule  sulfamethoxazole-trimethoprim (BACTRIM DS) 800-160 MG tablet          Review of Systems  SEE HPI  Physical Exam   BP: (!) 158/111  Pulse: (!) 123  Temp: (!) 101.9  F (38.8  C)  Resp: 26  SpO2: 94 %      Physical  Exam  Constitutional: Alert and conversant. Mildly diaphoretic. Coughing intermittedly.   HENT: Atraumatic  Eyes: Normal pupils   Neck: Normal ROM  CV: Tachycardic, Regular.   Pulmonary/Chest: Non-labored respirations, clear to auscultation bilaterally. No Wheezing.   Abdominal: Soft, non-tender, non-distended   MSK: PRITCHARD.   Neuro: Alert and appropriate. Cns, Strength, and Sensation grossly intact  Skin: Warm and dry. No diaphoresis. No rashes on exposed skin    Psych: Appropriate mood and affect     ED Course   Impression and Plan: Patient presents with concern of multiple complaints including generalized bodyaches, fever, shortness of breath.  Vitals reviewed, febrile, tachycardic, and hypertensive.  Otherwise within normal limits.  Patient nontoxic-appearing.  He does appear consistent with a viral type illness especially considering his recent exposure with multiple people in his life who have similar type symptoms.  Plan at this time will be to check the patient for COVID, influenza, and RSV as well as obtain a chest x-ray.  Symptoms will initially be treated with Tylenol.  Patient will be closely monitored with the pulse ox.  Final plan pending ED course.           ED Course as of 01/06/24 1426   Mon Jan 01, 2024   0808 IMPRESSION:  No acute cardiopulmonary disease.     0822 Patient is influenza A positive.    0830 Patient found to be influenza positive.  Patient notified of results.  Patient's O2 saturations remained in the mid 90s throughout his stay in the emergency department.  Discussed likely course with the patient.  Return precautions were discussed including severe worsening of the symptoms that brought the patient here today.  All questions were answered, patient was discharged in good condition.     Procedures           No results found for this or any previous visit (from the past 24 hour(s)).    Medications   acetaminophen (TYLENOL) tablet 975 mg (975 mg Oral $Given 1/1/24 3532)       Assessments &  Plan (with Medical Decision Making)     I have reviewed the nursing notes.    I have reviewed the findings, diagnosis, plan and need for follow up with the patient.           Medical Decision Making  The patient's presentation was of low complexity (2+ clearly self-limited or minor problems).    The patient's evaluation involved:  history and exam without other MDM data elements    The patient's management necessitated only low risk treatment.        Discharge Medication List as of 1/1/2024  8:28 AM          Final diagnoses:   Influenza A   Acute cough       1/1/2024   [facility] EM[facility]       Baltazar Dailey MD  01/01/24 0831       Baltazar Dailey MD  01/06/24 2396

## 2024-01-06 ENCOUNTER — HOSPITAL ENCOUNTER (EMERGENCY)
Facility: HOSPITAL | Age: 39
Discharge: HOME | End: 2024-01-06
Attending: FAMILY MEDICINE | Admitting: FAMILY MEDICINE
Payer: COMMERCIAL

## 2024-01-06 VITALS
DIASTOLIC BLOOD PRESSURE: 90 MMHG | HEIGHT: 72 IN | OXYGEN SATURATION: 96 % | BODY MASS INDEX: 37.8 KG/M2 | WEIGHT: 279.1 LBS | HEART RATE: 102 BPM | SYSTOLIC BLOOD PRESSURE: 189 MMHG | TEMPERATURE: 98.6 F | RESPIRATION RATE: 18 BRPM

## 2024-01-06 DIAGNOSIS — N49.2 CELLULITIS OF SCROTUM: ICD-10-CM

## 2024-01-06 PROCEDURE — 250N000011 HC RX IP 250 OP 636: Performed by: FAMILY MEDICINE

## 2024-01-06 PROCEDURE — 99283 EMERGENCY DEPT VISIT LOW MDM: CPT | Performed by: FAMILY MEDICINE

## 2024-01-06 PROCEDURE — 96372 THER/PROPH/DIAG INJ SC/IM: CPT | Performed by: FAMILY MEDICINE

## 2024-01-06 PROCEDURE — 99284 EMERGENCY DEPT VISIT MOD MDM: CPT

## 2024-01-06 RX ORDER — SULFAMETHOXAZOLE/TRIMETHOPRIM 800-160 MG
1 TABLET ORAL 2 TIMES DAILY
Qty: 20 TABLET | Refills: 0 | Status: SHIPPED | OUTPATIENT
Start: 2024-01-06 | End: 2024-01-11

## 2024-01-06 RX ORDER — KETOROLAC TROMETHAMINE 30 MG/ML
30 INJECTION, SOLUTION INTRAMUSCULAR; INTRAVENOUS ONCE
Status: COMPLETED | OUTPATIENT
Start: 2024-01-06 | End: 2024-01-06

## 2024-01-06 RX ORDER — CEFTRIAXONE SODIUM 1 G
1 VIAL (EA) INJECTION ONCE
Status: COMPLETED | OUTPATIENT
Start: 2024-01-06 | End: 2024-01-06

## 2024-01-06 RX ORDER — ACETAMINOPHEN 500 MG
1000 TABLET ORAL EVERY 6 HOURS PRN
COMMUNITY

## 2024-01-06 RX ADMIN — KETOROLAC TROMETHAMINE 30 MG: 30 INJECTION, SOLUTION INTRAMUSCULAR; INTRAVENOUS at 09:54

## 2024-01-06 RX ADMIN — CEFTRIAXONE 1 G: 1 INJECTION, POWDER, FOR SOLUTION INTRAMUSCULAR; INTRAVENOUS at 09:54

## 2024-01-06 NOTE — ED PROVIDER NOTES
History     Chief Complaint   Patient presents with    Fever    Groin Swelling     HPI  Mario Vance is a 38 year old male who presented to the ER with chief complaint of pain and swelling of the right groin area, started last Monday as  a pimple or ingrown hair, that popped with some pus came out and since then get bigger.  Tested positive for influenza A on January 1 and had fever cough, congestion and bodyaches since then.  Tested negative for COVID.  Denies any nausea vomiting.  Denies any chest pain or shortness of breath.  Denies any abdominal pain diarrhea or constipation.  Denies any testicular pain or swelling.  Denies any urinary symptoms.    Allergies:  No Known Allergies    Problem List:    Patient Active Problem List    Diagnosis Date Noted    Tobacco abuse      Priority: Medium    Morbid obesity (H) 02/21/2019     Priority: Medium        Past Medical History:    Past Medical History:   Diagnosis Date    Tobacco abuse        Past Surgical History:    Past Surgical History:   Procedure Laterality Date    AMPUTATE FINGER(S) Right 4/6/2015    Procedure: AMPUTATE FINGER(S);  Surgeon: Shakir Mcmanus MD;  Location: [facility]       Family History:    Family History   Problem Relation Age of Onset    Kidney Cancer Father     Peripheral Vascular Disease Father        Social History:  Marital Status:  Single [1]  Social History     Tobacco Use    Smoking status: Never    Smokeless tobacco: Current     Types: Chew    Tobacco comments:     chews daily    Substance Use Topics    Alcohol use: Yes     Comment: 1/2 -1 case of beer weekly    Drug use: No        Medications:    acetaminophen (TYLENOL) 500 MG tablet  allopurinol (ZYLOPRIM) 100 MG tablet  colchicine (COLCRYS) 0.6 MG tablet  indomethacin (INDOCIN) 50 MG capsule  sulfamethoxazole-trimethoprim (BACTRIM DS) 800-160 MG tablet          Review of Systems   All other systems reviewed and are negative.      Physical Exam   BP: 158/94  Pulse: 108  Temp: 99.8  F  (37.7  C)  Resp: 18  Height: 182.9 cm (6')  Weight: 126.6 kg (279 lb 1.6 oz)  SpO2: 95 %      Physical Exam  Constitutional:       General: He is not in acute distress.     Appearance: Normal appearance. He is not ill-appearing, toxic-appearing or diaphoretic.   HENT:      Head: Atraumatic.      Nose: Nose normal. No congestion or rhinorrhea.   Eyes:      General: No scleral icterus.        Left eye: No discharge.      Extraocular Movements: Extraocular movements intact.      Conjunctiva/sclera: Conjunctivae normal.      Pupils: Pupils are equal, round, and reactive to light.   Neck:      Vascular: No carotid bruit.   Cardiovascular:      Rate and Rhythm: Normal rate and regular rhythm.      Heart sounds: Normal heart sounds. No murmur heard.  Pulmonary:      Effort: Pulmonary effort is normal. No respiratory distress.      Breath sounds: Normal breath sounds. No stridor. No wheezing, rhonchi or rales.   Chest:      Chest wall: No tenderness.   Abdominal:      General: Bowel sounds are normal. There is no distension.      Palpations: Abdomen is soft. There is no mass.      Tenderness: There is no abdominal tenderness. There is no right CVA tenderness, left CVA tenderness, guarding or rebound.      Hernia: No hernia is present.   Genitourinary:     Penis: Normal.       Testes: Normal.      Comments: Small area of erythema, mild tenderness and induration of the right groin/scrotal area 2 x 3 cm.  No fluctuation, no abscess, no discharge.  No testicular tenderness or swelling.  Musculoskeletal:         General: No tenderness.      Cervical back: Normal range of motion and neck supple. No rigidity or tenderness.   Lymphadenopathy:      Cervical: No cervical adenopathy.   Skin:     General: Skin is warm.      Coloration: Skin is not jaundiced or pale.      Findings: No bruising, erythema, lesion or rash.   Neurological:      General: No focal deficit present.      Mental Status: He is alert and oriented to person, place,  and time. Mental status is at baseline.      Cranial Nerves: No cranial nerve deficit.      Sensory: No sensory deficit.      Motor: No weakness.      Coordination: Coordination normal.      Gait: Gait normal.      Deep Tendon Reflexes: Reflexes normal.   Psychiatric:         Mood and Affect: Mood normal.         ED Course        Patient was seen and examined shortly after arrival.  Stable.  Hemodynamically stable.  No sign of severe sepsis or septic shock.  This is cellulitis.  No sign of Mirna's gangrene.  No sign of abscess.  Given 1 g of IM Rocephin at the milligram IM Toradol.  And started on Bactrim.  Advised to    Rest and stay well-hydrated  Alternate Tylenol and ibuprofen for pain and discomfort or fever  Close follow-up with PCP  Come back for any concern or any worsening symptoms  Patient agrees with the plan.  Stable for discharge.         Procedures                No results found for this or any previous visit (from the past 24 hour(s)).    Medications   ketorolac (TORADOL) injection 30 mg (30 mg Intramuscular $Given 1/6/24 0954)   cefTRIAXone (ROCEPHIN) in lidocaine 1% (PF) for IM administration 1 g (1 g Intramuscular $Given 1/6/24 0954)       Assessments & Plan (with Medical Decision Making)     I have reviewed the nursing notes.    I have reviewed the findings, diagnosis, plan and need for follow up with the patient.        New Prescriptions    SULFAMETHOXAZOLE-TRIMETHOPRIM (BACTRIM DS) 800-160 MG TABLET    Take 1 tablet by mouth 2 times daily for 10 days       Final diagnoses:   Cellulitis of scrotum       1/6/2024   [facility]       Jessica Yusuf MD  01/06/24 5238

## 2024-01-06 NOTE — ED TRIAGE NOTES
C/o fever and right groin swelling. Denies testicular or groin pain. States it started as an ingrown hair and has grown significantly. Also c/o sweating profusely which is not at all normal for him. C/o generalized body aches rated 1/10. States he had influenza a week ago but the fever from that had gone away. Reports taking tyelnol 1000 mg at 0100 and 0600 this morning.

## 2024-01-06 NOTE — ED NOTES
Patient to room 1, changed into gown and call light within reach.    Patient presents to ED today with a cough and fever. He was diagnosed with Influenza 5 days ago and states he has not been able to control his cough. States it is productive. He has had a fever off and on with this.    He states that the also has a swollen left testicle. States he had an ingrown hair that he pulled and now his testicle is red and swollen.    Detail Level: Simple Comment: Pt reports odd sensation on right ear. Pt was seen by ENT completed evaluation of external ear canal, nothing identified. No rash visible on exam today on visible portion of skin. Render Risk Assessment In Note?: no

## 2024-01-06 NOTE — ED NOTES
Patient did not have any reactions to the antibiotics.    AVS reviewed. All questions and concerns answered. No further questions at this time.

## 2024-01-08 ENCOUNTER — TELEPHONE (OUTPATIENT)
Dept: FAMILY MEDICINE | Facility: OTHER | Age: 39
End: 2024-01-08

## 2024-01-08 NOTE — TELEPHONE ENCOUNTER
Please clarify- any fevers, spreading redness?     Continue bactrim Ds as prescribed. If worsening throughout the day, needs to be seen    Otherwise place on my schedule at 10 am tomorrow morning for ER follow-up    CHANDRA Casillas CNP on 1/8/2024 at 9:41 AM

## 2024-01-08 NOTE — TELEPHONE ENCOUNTER
ER F/U  1/6/23    Cellulitis of scrotum  Clinical impression Fever  Groin Swelling  Chief complaint       Patient was told to call the provider Monday.  States that the right groin/scotal area has swollen overnight but just started the antibiotic yesterday.            HPI  Mario Vance is a 38 year old male who presented to the ER with chief complaint of pain and swelling of the right groin area, started last Monday as  a pimple or ingrown hair, that popped with some pus came out and since then get bigger.  Tested positive for influenza A on January 1 and had fever cough, congestion and bodyaches since then.  Tested negative for COVID.  Denies any nausea vomiting.  Denies any chest pain or shortness of breath.  Denies any abdominal pain diarrhea or constipation.  Denies any testicular pain or swelling.  Denies any urinary symptoms.     ED Course      Patient was seen and examined shortly after arrival.  Stable.  Hemodynamically stable.  No sign of severe sepsis or septic shock.  This is cellulitis.  No sign of Mirna's gangrene.  No sign of abscess.  Given 1 g of IM Rocephin at the milligram IM Toradol.  And started on Bactrim.  Advised to     Rest and stay well-hydrated  Alternate Tylenol and ibuprofen for pain and discomfort or fever  Close follow-up with PCP  Come back for any concern or any worsening symptoms  Patient agrees with the plan.  Stable for discharge.    New Prescriptions     SULFAMETHOXAZOLE-TRIMETHOPRIM (BACTRIM DS) 800-160 MG TABLET    Take 1 tablet by mouth 2 times daily for 10 days

## 2024-01-09 ENCOUNTER — OFFICE VISIT (OUTPATIENT)
Dept: FAMILY MEDICINE | Facility: OTHER | Age: 39
End: 2024-01-09
Payer: COMMERCIAL

## 2024-01-09 VITALS
DIASTOLIC BLOOD PRESSURE: 88 MMHG | BODY MASS INDEX: 37.78 KG/M2 | TEMPERATURE: 96.7 F | WEIGHT: 278.6 LBS | SYSTOLIC BLOOD PRESSURE: 144 MMHG | HEART RATE: 97 BPM | OXYGEN SATURATION: 97 %

## 2024-01-09 DIAGNOSIS — N49.2 SCROTUM, ABSCESS: Primary | ICD-10-CM

## 2024-01-09 DIAGNOSIS — L03.314 CELLULITIS OF GROIN: ICD-10-CM

## 2024-01-09 PROCEDURE — 87186 SC STD MICRODIL/AGAR DIL: CPT

## 2024-01-09 PROCEDURE — 96372 THER/PROPH/DIAG INJ SC/IM: CPT

## 2024-01-09 PROCEDURE — 99213 OFFICE O/P EST LOW 20 MIN: CPT | Mod: 25

## 2024-01-09 PROCEDURE — 87077 CULTURE AEROBIC IDENTIFY: CPT

## 2024-01-09 PROCEDURE — 87070 CULTURE OTHR SPECIMN AEROBIC: CPT

## 2024-01-09 PROCEDURE — 87205 SMEAR GRAM STAIN: CPT

## 2024-01-09 RX ORDER — CEFTRIAXONE SODIUM 1 G
2 VIAL (EA) INJECTION ONCE
Qty: 5.71 ML | Refills: 0 | Status: COMPLETED | OUTPATIENT
Start: 2024-01-09 | End: 2024-01-09

## 2024-01-09 RX ORDER — CEFTRIAXONE SODIUM 2 G
2 VIAL (EA) INJECTION ONCE
Qty: 5.71 ML | Refills: 0 | Status: DISCONTINUED | OUTPATIENT
Start: 2024-01-09 | End: 2024-01-09 | Stop reason: ALTCHOICE

## 2024-01-09 RX ORDER — CEFTRIAXONE SODIUM 1 G
1 VIAL (EA) INJECTION ONCE
Status: DISCONTINUED | OUTPATIENT
Start: 2024-01-09 | End: 2024-01-09 | Stop reason: DRUGHIGH

## 2024-01-09 RX ADMIN — Medication 2 G: at 13:59

## 2024-01-09 NOTE — PROGRESS NOTES
Assessment & Plan     Scrotum, abscess/Cellulitis of groin  Started on bactrim DS on 1/6- taking as prescribed without complication. Per patient- swelling and area of erythema improving. Based on previous documentation, unclear. Subjectively feeling well and patient is having no pain. Expressed large amount of purulent drainage today in the office. Wound culture obtained and pending. Continue Bactrim DS for 10 days course. Also given 1 g rocephin today. Follow- up scheduled on Monday. Discussed with patient that with the onset of pain, worsening swelling, erythema or fevers- he should be seen.   - cefTRIAXone (ROCEPHIN) in NS for IM administration 1 g  - Abscess Aerobic Bacterial Culture Routine With Gram Stain      20 minutes spent by me on the date of the encounter doing chart review, history and exam, documentation and further activities per the note     MED REC REQUIRED  Post Medication Reconciliation Status:  Discharge medications reconciled and changed, see notes/orders      Return in about 1 week (around 1/16/2024) for Follow up.    CHANDRA Casillas [facility] - ARPITA Ascencio is a 38 year old, presenting for the following health issues:  ER F/U    HPI     ED/UC Followup:    Facility:  [facility]  Date of visit: 1/6  Reason for visit: Fever, Groin Swelling  Current Status: pt states is doing better     Seen in the ER on 1/6 related to a fever, groin swelling. Started as ingrown hair and grew throughout the week. Also noting fever, cough, congestion since 1/1, with positive influenza A. Treated for cellulitis- given 1 g rocephin and started on bactrim Ds.     - Symptoms mostly improved, lingering dry cough. Non-productive.   - Notes swelling improved to scrotum. Now draining, pus as of yesterday.  - Denies any testicular or scrotal pain.    - No fevers, chills.   - Taking Bactrim Ds as prescribed    Review of Systems   Constitutional, HEENT, cardiovascular, pulmonary,  GI, , musculoskeletal, neuro, skin, endocrine and psych systems are negative, except as otherwise noted.      Objective    BP (!) 144/88 (BP Location: Left arm, Patient Position: Sitting, Cuff Size: Adult Large)   Pulse 97   Temp (!) 96.7  F (35.9  C) (Tympanic)   Wt 126.4 kg (278 lb 9.6 oz)   SpO2 97%   BMI 37.78 kg/m    Body mass index is 37.78 kg/m .    Physical Exam   GENERAL: healthy, alert and no distress  RESP: lungs clear to auscultation - no rales, rhonchi or wheezes  CV: regular rate and rhythm, normal S1 S2, no S3 or S4, no murmur, click or rub, no peripheral edema and peripheral pulses strong  MS: no gross musculoskeletal defects noted, no edema  SKIN: Right scrotum abscess, draining purulent drainage - 0.2 cm opening. Large amount expressed. Induration of approximately 3 cm surrounding opening.   PSYCH: mentation appears normal, affect anxious

## 2024-01-11 ENCOUNTER — TELEPHONE (OUTPATIENT)
Dept: FAMILY MEDICINE | Facility: OTHER | Age: 39
End: 2024-01-11

## 2024-01-11 DIAGNOSIS — A49.02 MRSA INFECTION: Primary | ICD-10-CM

## 2024-01-11 LAB
BACTERIA ABSC ANAEROBE+AEROBE CULT: ABNORMAL
GRAM STAIN RESULT: ABNORMAL
GRAM STAIN RESULT: ABNORMAL

## 2024-01-11 RX ORDER — SULFAMETHOXAZOLE/TRIMETHOPRIM 800-160 MG
2 TABLET ORAL 2 TIMES DAILY
Qty: 28 TABLET | Refills: 0 | Status: SHIPPED | OUTPATIENT
Start: 2024-01-11 | End: 2024-01-18

## 2024-01-11 NOTE — TELEPHONE ENCOUNTER
Patient notified results, agree to dose increase, verbalized understanding to take 2 tablets twice daily for 7 days.  Genesis Norris LPN

## 2024-01-11 NOTE — TELEPHONE ENCOUNTER
Noted. Given guidelines and pt weight, recommend dose increase. Please update patient with new orders below.     Elisabeth Carvalho MD

## 2024-01-11 NOTE — TELEPHONE ENCOUNTER
Pt is calling back stating the antibiotic increase is exactly what he has been taking, old prescription  Sulfamethoxazole-Trimethoprim 800/160 mg new prescription is exactly the same, if you can please give the pt a call [phone]. Pt is confused as to why he got the same prescription

## 2024-01-14 NOTE — PROGRESS NOTES
Assessment & Plan     MRSA infection/Scrotum, abscess/Cellulitis of groin  Improving. No further drainage. Continue Bactrim Ds 2 tabs BID until completion on 1/17 (this was adjusted for weight following culture results). Follow-up on Friday. Encouraged to call with any worsening or concerns.      20 minutes spent by me on the date of the encounter doing chart review, history and exam, documentation and further activities per the note         Return in about 4 days (around 1/19/2024).    CHANDRA Casillas [facility] - ARPITA Ascencio is a 38 year old, presenting for the following health issues:  Wound Check    HPI     Concern - Abscess, Follow-up  Onset: 1/9/24  Description: Scrotum, abscess  Intensity: moderate  Progression of Symptoms:  improving  Accompanying Signs & Symptoms: Swollen scrotum  Previous history of similar problem: No  Precipitating factors:        Worsened by: NA  Alleviating factors:        Improved by: abx  Therapies tried and outcome: Bactrim      Seen in the ER on 1/6, started on bactrim DS. Seen in the clinic on 1/9, now with purulent drainage. Wound culture obtained with MRSA.    Notes substantial improvement. Swelling improved. No pain. No drainage. Denies fevers, chills. Taking bactrim Ds as prescribed- did increase to 2 tablets.    Review of Systems   Constitutional, HEENT, cardiovascular, pulmonary, GI, , musculoskeletal, neuro, skin, endocrine and psych systems are negative, except as otherwise noted.      Objective    /76 (BP Location: Left arm, Patient Position: Sitting, Cuff Size: Adult Large)   Pulse 68   Temp 97.2  F (36.2  C) (Tympanic)   Resp 16   Ht 1.829 m (6')   Wt 125.6 kg (277 lb)   SpO2 97%   BMI 37.57 kg/m    Body mass index is 37.57 kg/m .    Physical Exam   GENERAL: alert and no distress  RESP: lungs clear to auscultation - no rales, rhonchi or wheezes  CV: regular rate and rhythm, normal S1 S2, no S3 or S4, no murmur,  click or rub, no peripheral edema and peripheral pulses strong  ABDOMEN: soft, nontender, no hepatosplenomegaly, no masses and bowel sounds normal   (male): 1.0-1.5 cm area of induration- right scrotum, healing. No fluctuance. No erythema or warmth. No testicular swelling.  NEURO: Normal strength and tone, mentation intact and speech normal  PSYCH: mentation appears normal, affect normal/bright

## 2024-01-15 ENCOUNTER — OFFICE VISIT (OUTPATIENT)
Dept: FAMILY MEDICINE | Facility: OTHER | Age: 39
End: 2024-01-15
Payer: COMMERCIAL

## 2024-01-15 VITALS
SYSTOLIC BLOOD PRESSURE: 134 MMHG | WEIGHT: 277 LBS | OXYGEN SATURATION: 97 % | DIASTOLIC BLOOD PRESSURE: 76 MMHG | HEIGHT: 72 IN | HEART RATE: 68 BPM | TEMPERATURE: 97.2 F | BODY MASS INDEX: 37.52 KG/M2 | RESPIRATION RATE: 16 BRPM

## 2024-01-15 DIAGNOSIS — A49.02 MRSA INFECTION: Primary | ICD-10-CM

## 2024-01-15 DIAGNOSIS — L03.314 CELLULITIS OF GROIN: ICD-10-CM

## 2024-01-15 DIAGNOSIS — N49.2 SCROTUM, ABSCESS: ICD-10-CM

## 2024-01-15 PROCEDURE — 99213 OFFICE O/P EST LOW 20 MIN: CPT

## 2024-01-15 ASSESSMENT — PAIN SCALES - GENERAL: PAINLEVEL: NO PAIN (0)

## 2024-01-19 ENCOUNTER — TELEPHONE (OUTPATIENT)
Dept: FAMILY MEDICINE | Facility: OTHER | Age: 39
End: 2024-01-19

## 2024-01-19 ENCOUNTER — OFFICE VISIT (OUTPATIENT)
Dept: FAMILY MEDICINE | Facility: OTHER | Age: 39
End: 2024-01-19
Payer: COMMERCIAL

## 2024-01-19 VITALS
TEMPERATURE: 97.7 F | RESPIRATION RATE: 17 BRPM | WEIGHT: 275.8 LBS | DIASTOLIC BLOOD PRESSURE: 74 MMHG | OXYGEN SATURATION: 96 % | BODY MASS INDEX: 37.41 KG/M2 | SYSTOLIC BLOOD PRESSURE: 122 MMHG | HEART RATE: 106 BPM

## 2024-01-19 DIAGNOSIS — M10.472 ACUTE GOUT DUE TO OTHER SECONDARY CAUSE INVOLVING TOE OF LEFT FOOT: ICD-10-CM

## 2024-01-19 DIAGNOSIS — L30.4 INTERTRIGO: ICD-10-CM

## 2024-01-19 DIAGNOSIS — M25.572 PAIN IN JOINT, ANKLE AND FOOT, LEFT: ICD-10-CM

## 2024-01-19 DIAGNOSIS — N49.2 SCROTUM, ABSCESS: Primary | ICD-10-CM

## 2024-01-19 PROCEDURE — 99213 OFFICE O/P EST LOW 20 MIN: CPT

## 2024-01-19 RX ORDER — INDOMETHACIN 50 MG/1
50 CAPSULE ORAL
Qty: 45 CAPSULE | Refills: 1 | Status: SHIPPED | OUTPATIENT
Start: 2024-01-19

## 2024-01-19 RX ORDER — COLCHICINE 0.6 MG/1
TABLET ORAL
Qty: 30 TABLET | Refills: 1 | Status: CANCELLED | OUTPATIENT
Start: 2024-01-19

## 2024-01-19 RX ORDER — CLOTRIMAZOLE 1 %
CREAM (GRAM) TOPICAL 2 TIMES DAILY
Qty: 12 G | Refills: 0 | Status: SHIPPED | OUTPATIENT
Start: 2024-01-19

## 2024-01-19 RX ORDER — ALLOPURINOL 100 MG/1
100 TABLET ORAL AT BEDTIME
Qty: 30 TABLET | Refills: 1 | Status: SHIPPED | OUTPATIENT
Start: 2024-01-19

## 2024-01-19 ASSESSMENT — PAIN SCALES - GENERAL: PAINLEVEL: MODERATE PAIN (5)

## 2024-01-19 NOTE — TELEPHONE ENCOUNTER
8:53 AM    Reason for Call: Phone Call    Description[facility] [place] [facility] told the patient they never received the prescriptions from the morning.                   Preferred method for responding to this message: Telephone Call  What is your phone number ? [phone]    If we cannot reach you directly, may we leave a detailed response at the number you provided? Yes    Can this message wait until your PCP/provider returns, if available today? YES, No

## 2024-01-19 NOTE — PROGRESS NOTES
Assessment & Plan     Scrotum, abscess  Improved. No fluctuance on exam. Now off bactrim DS. Discussed monitoring closely for recurrence of symptoms. Patient to call with any concerns.     Intertrigo  Mild, noted in groin folds. Start clotrimazole.  - clotrimazole (LOTRIMIN) 1 % external cream  Dispense: 12 g; Refill: 0    Acute gout due to other secondary cause involving toe of left foot/Pain in joint, ankle, foot left.  1-2 days. History of gout. Patient stating this feels identical. Discussed various causes and presenting without any erythema, warmth would not be typical. Offered xray which patient declined. Will fill meds as below per patient request. Encouraged rest, ice. Discussed that if not improving, will see patient back next week.  - indomethacin (INDOCIN) 50 MG capsule  Dispense: 45 capsule; Refill: 1  - allopurinol (ZYLOPRIM) 100 MG tablet  Dispense: 30 tablet; Refill: 1      20 minutes spent by me on the date of the encounter doing chart review, history and exam, documentation and further activities per the note      No follow-ups on file.    Subjective   Sonu is a 38 year old, presenting for the following health issues:  No chief complaint on file.    HPI     Cellulitis/Abscess Follow-up  Onset/Duration: f/u  Description  Location: groin area  Character: blotchy, raised, red  Itching: mild  Intensity:  moderate  Progression of Symptoms:  improving  Accompanying signs and symptoms:   Fever: No  Body aches or joint pain: No  Sore throat symptoms: No  Recent cold symptoms: YES- had the flu  History:           Previous episodes of similar rash: None  New exposures:  None  Recent travel: No  Exposure to similar rash: No  Precipitating or alleviating factors: ABX  Therapies tried and outcome: Rocephin    - Completed course of bactrim ds on Wednesday.   - Improved. No drainage. No pain. NO fevers, chills.     Musculoskeletal problem/pain- Left ankle    Duration: 1-2 days  Description  Location:  Ankle  Intensity:  moderate  Accompanying signs and symptoms: swelling  History  Previous similar problem: YES  Previous evaluation:  none  Precipitating or alleviating factors:  Trauma or overuse: YES  Aggravating factors include: walking  Therapies tried and outcome: rest/inactivity and ice     - Notes lateral left ankle pain for past 1-2 days  - Has had several gout attacks in the past to left ankle and big toe  - States this feels identical  - No trauma or injury  - States he had a ham dinner causing gout attack.  - Has not been taking allopurinol.     Review of Systems  Constitutional, HEENT, cardiovascular, pulmonary, GI, , musculoskeletal, neuro, skin, endocrine and psych systems are negative, except as otherwise noted.      Objective    /74   Pulse 106   Temp 97.7  F (36.5  C) (Tympanic)   Resp 17   Wt 125.1 kg (275 lb 12.8 oz)   SpO2 96%   BMI 37.41 kg/m    Body mass index is 37.41 kg/m .  Physical Exam   GENERAL: alert and no distress  RESP: lungs clear to auscultation - no rales, rhonchi or wheezes  CV: regular rate and rhythm, normal S1 S2, no S3 or S4, no murmur, click or rub, no peripheral edema  ABDOMEN: soft, nontender, no hepatosplenomegaly, no masses and bowel sounds normal   (male): Healing abscess. 0.5 cm of induration. No fluctuance. No erythema or warmth. Testicles normal without atrophy or masses and penis normal without urethral discharge  MS: Left lateral ankle swelling with minimal erythema. No warmth. Tender on palpation. Full ROM.   SKIN: Some maceration and fissuring, erythematous lesion in bilat groin fold (right worse than left).   PSYCH: mentation appears normal, affect normal/bright            Signed Electronically by: CHANDRA Casillas CNP

## 2024-04-26 ENCOUNTER — VIRTUAL VISIT (OUTPATIENT)
Dept: PULMONOLOGY | Facility: OTHER | Age: 39
End: 2024-04-26
Attending: FAMILY MEDICINE
Payer: COMMERCIAL

## 2024-04-26 VITALS — WEIGHT: 220 LBS | HEIGHT: 66 IN | BODY MASS INDEX: 35.36 KG/M2

## 2024-04-26 DIAGNOSIS — G47.10 HYPERSOMNIA: ICD-10-CM

## 2024-04-26 DIAGNOSIS — G47.33 OSA (OBSTRUCTIVE SLEEP APNEA): Primary | ICD-10-CM

## 2024-04-26 PROCEDURE — 99213 OFFICE O/P EST LOW 20 MIN: CPT | Mod: 95 | Performed by: FAMILY MEDICINE

## 2024-04-26 ASSESSMENT — PAIN SCALES - GENERAL: PAINLEVEL: NO PAIN (0)

## 2024-04-26 NOTE — NURSING NOTE
Is the patient currently in the state of [place]? YES    Visit mode:VIDEO    If the visit is dropped, the patient can be reconnected by: VIDEO VISIT: Text to cell phone:   Telephone Information:   Mobile [phone]       Will anyone else be joining the visit? NO  (If patient encounters technical issues they should call [phone])    How would you like to obtain your AVS? MyChart    Are changes needed to the allergy or medication list? Pt stated no changes to allergies and Pt stated no med changes    Are refills needed on medications prescribed by this physician? NO  Has patient had flu shot for current/most recent flu season? If so, when? No    Reason for visit: RECHDARY BUSCHF

## 2024-04-26 NOTE — PROGRESS NOTES
"Virtual Visit Details    Type of service:  Video Visit     Originating Location (pt. Location): Home    Distant Location (provider location):  Off-site  Platform used for Video Visit: Mechelle    Mario Vance is a 38 year old male who is being evaluated via a billable video visit.       The patient has been notified of following:      \"This video visit will be conducted via a call between you and your physician/provider. We have found that certain health care needs can be provided without the need for an in-person physical exam.  This service lets us provide the care you need with a video conversation.  If a prescription is necessary we can send it directly to your pharmacy.  If lab work is needed we can place an order for that and you can then stop by our lab to have the test done at a later time.     Video visits are billed at different rates depending on your insurance coverage.  Please reach out to your insurance provider with any questions.     If during the course of the call the physician/provider feels a video visit is not appropriate, you will not be charged for this service.\"     Patient has given verbal consent for Video visit? Yes  How would you like to obtain your AVS? Mail a copy  If you are dropped from the video visit, the video invite should be resent to: Text to cell phone: -  Will anyone else be joining your video visit? No  If patient encounters technical issues they should call [phone]      Video-Visit Details     Type of service:  Video Visit     Start Time:  3:55pm  End Time:  4:05pm    Originating Location (pt. Location): Home     Distant Location (provider location):  Off-site, [facility] [place]       Platform used for Video Visit: Mechelle    Virtual visit for follow-up to discuss starting CPAP.     A/P:  1.)  Mild HEMANT (pAHI 12.8) without sleep-associated hypoxemia  -We discussed the pathophysiology of obstructive sleep apnea, that this level of obstructive sleep " apnea is typically not felt to have a significant increase of long-term cardiovascular risk factors for treatment may have benefit for sleep quality, sleep maintenance insomnia, daytime fatigue and cognition.  - He is motivated to try CPAP given multiple friends who have done very well with it.  - Orders placed for CPAP auto titrate 5-15 cm H2O.     2.)  Chronic sleep onset and maintenance insomnia  - Potential RLS  - Unclear sleep wake pattern with unknown amount of daytime napping, inadequate sleep hygiene, sleep expansion, can't rule out sleep-state misperception.  - Nicotine use  -He like to start with treatment of obstructive sleep apnea, this seems reasonable.  If persistent insomnia, will recommend maintaining sleep diaries and likely actigraphy.     3.) Severe / Morbid Obesity:   - Counseled on the importance of strict adherence to diet, an exercise routine, and reducing weight.  Weight loss would improve sleep-disordered breathing.    The longitudinal plan of care for the diagnosis(es)/condition(s) as documented were addressed during this visit. Due to the added complexity in care, I will continue to support Sonu in the subsequent management and with ongoing continuity of care.     SUBJECTIVE:  Mario Vance is a 38 year old male.    Pertinent PMHx of morbid obesity, anxiety and / or depression, PTSD, EtOH use.     Prior Sleep Testin2023 - WatchPAT HST with weight 270 lbs, BMI 43.6.  pAHI 12.8.  sustained hypoxemia was not present. Mean oxygen saturation was 93%.  Minimum was 86%.  Time with saturation less than 88% was 0.2 minutes.     2023 -reviewed his home sleep test results and sleep history in more detail.     His sleep-wake pattern seems to be quite variable and it is challenging to get an idea of averages or ranges of timing.  Part of the challenges that he works as a  and often will travel and live onsite for potentially weeks or months at a time.  During  "these times he will often work 6 days/week, 12-hour shifts.  He relates he returned or finished a job where he was onsite for 5 months living in a hotel.     Currently, he will seem to get to bed between 9-10 PM, will then watch the clock, lay in the dark and denies using TV or phone in bed.  The actual time of falling asleep is unclear.  He will have potentially up to 3 awakenings per night, to use the bathroom, feeling of his arms or hands being numb, he will then be up for \"a while\" and often will watch TV during this time.  On workdays will wake up between 430-6:30 AM.  He will often nap over his lunch break.  On weekends, he may sleep until noon or 1 PM.     Caffeine use: He has 1 energy drink in the morning  Nicotine use: He will use to her stuff, will typically use 1 can in 2-3 days  Alcohol use: May be as high as 6-7 beers per night, 7 days/week.  He states his recently is less than usual due to being winter and not working and also increased weight gain.     Per questionnaire: \"I don't sleep\"     Sxs for years, recommended for sleep testing.     Caffeine use:  No for 3+ per day.  No for within 6 hours of bed.     Tobacco use: Yes     Sleep Habits:   Do you read in bed? No  Do you eat in bed? No  Do you watch TV in bed? Yes  Do you work in bed? No  Do you use a phone or computer in bed? Yes     Sleep pattern:  Workdays.  8pm - 5am, total sleep time 2 hours.  Weekends.  8pm - 5am, total sleep time 2 hours.  Time to fall asleep: \"a few hours\".  Awakenings: 2-6 times per night, 60+ minutes to return to sleep and awake for 6 hours total.  What do you do when you wake up? Get up and drink water watch tv  Napping.  7 days per week, \"minutes to hours\" per nap.     Sleep Habits:   Do you read in bed? No  Do you eat in bed? No  Do you watch TV in bed? Yes  Do you work in bed? No  Do you use a phone or computer in bed? Yes        Yes for RLS screen.  No for sleep walking.  No for dream enactment behavior.  Yes for " bruxism.     Yes for morning headaches.  Yes for snoring.  Yes for observed apnea.  Unknown for FHx of HEMANT.     SHx:  Single, lives alone.  Not currently working.    A/P to start CPAP auto 5-15, for chronic insomnia discussed reducing alcohol use, need for sleep diaries +/- actigraphy, weight management.    Today - He notes he did not hear about getting the CPAP machine after our prior visit, but he is interested in trying the CPAP now.  This is due to people around him while sleeping being increasingly concerned about snoring and observed apnea.    Most recent weight 275 lbs on 1/19/2024.    Past medical history:    Patient Active Problem List    Diagnosis Date Noted    Tobacco abuse      Priority: Medium    Morbid obesity (H) 02/21/2019     Priority: Medium       10 point ROS of systems including Constitutional, Eyes, Respiratory, Cardiovascular, Gastroenterology, Genitourinary, Integumentary, Muscularskeletal, Psychiatric were all negative except for pertinent positives noted in my HPI.    Current Outpatient Medications   Medication Sig Dispense Refill    acetaminophen (TYLENOL) 500 MG tablet Take 1,000 mg by mouth every 6 hours as needed for mild pain      allopurinol (ZYLOPRIM) 100 MG tablet Take 1 tablet (100 mg) by mouth at bedtime 30 tablet 1    clotrimazole (LOTRIMIN) 1 % external cream Apply topically 2 times daily 12 g 0    colchicine (COLCRYS) 0.6 MG tablet Take one pill every 2 hours to max of 6 pills a day  Or diarrhea develo 30 tablet 1    indomethacin (INDOCIN) 50 MG capsule Take 1 capsule (50 mg) by mouth 3 times daily (with meals) 45 capsule 1       OBJECTIVE:  There were no vitals taken for this visit.    Physical Exam     ---  This note was written with the assistance of the Dragon voice-dictation technology software. The final document, although reviewed, may contain errors. For corrections, please contact the office.    Total time spent preparing to see the patient, review of chart, obtaining  history and physical examination, review of sleep testing, review of treatment options, education, discussion with patient and documenting in Epic / EMR was 15 minutes.  All time involved was spent on the day of service for the patient (the same day as the patient's appointment).    Hugo Veras MD    Sleep Medicine  [place], [place]  Main Office: [phone]  [facility] [address], [address]  Schedule visits: [phone]  Main Office: [phone]  Fax: [phone]

## 2024-04-26 NOTE — PATIENT INSTRUCTIONS
Hello,    It was a pleasure to meet you today.  Here is a summary of our plan:    I entered the orders for the CPAP machine, you should be hearing from our durable medical equipment team in the next 1-2 weeks.  I would like to follow-up 4-6 weeks after starting CPAP to see how you are doing.    We are hoping to get your feedback regarding your experience with your clinic visit today with myself and the participation with the medical student.  This is a very short 7 question questionnaire and should only take only 1 or 2 minutes to complete.  The URL is listed below, or you can scan the Bouncefootball code to pull this up with your smart phone.  This information is anonymous.    I greatly appreciate your time in completing this questionnaire.    [email or web address]        Hugo Veras MD    Sleep Medicine  [place], [place]  Main Office: [phone]  [facility] [address], [address]  Schedule visits: [phone]  Main Office: [phone]  Fax: [phone]

## 2024-04-27 ENCOUNTER — HEALTH MAINTENANCE LETTER (OUTPATIENT)
Age: 39
End: 2024-04-27

## 2024-05-01 ENCOUNTER — DOCUMENTATION ONLY (OUTPATIENT)
Dept: HOME HEALTH SERVICES | Facility: CLINIC | Age: 39
End: 2024-05-01

## 2024-05-01 NOTE — PROGRESS NOTES
Patient was offered choice of vendor and chose [facility].  Patient Mario Vance was set up at [employer] [place] on May 1, 2024. Patient received a Resmed Airsense 11 Pressures were set at  5-15 cm H2O.   Patient s ramp is off and FLEX/EPR is 2.  Patient received a Resmed Mask name: [id number]  Nasal mask size Medium, heated tubing and heated humidifier.  Patient has the following compliance requirements: none.    noah Alegria

## 2025-05-11 ENCOUNTER — HEALTH MAINTENANCE LETTER (OUTPATIENT)
Age: 40
End: 2025-05-11